# Patient Record
Sex: MALE | Race: WHITE | URBAN - METROPOLITAN AREA
[De-identification: names, ages, dates, MRNs, and addresses within clinical notes are randomized per-mention and may not be internally consistent; named-entity substitution may affect disease eponyms.]

---

## 2017-04-04 ENCOUNTER — OP HISTORICAL/CONVERTED ENCOUNTER (OUTPATIENT)
Dept: OTHER | Age: 57
End: 2017-04-04

## 2018-08-10 ENCOUNTER — OP HISTORICAL/CONVERTED ENCOUNTER (OUTPATIENT)
Dept: OTHER | Age: 58
End: 2018-08-10

## 2021-09-10 ENCOUNTER — HOSPITAL ENCOUNTER (EMERGENCY)
Age: 61
Discharge: HOME | End: 2021-09-10
Attending: FAMILY MEDICINE
Payer: MEDICARE

## 2021-09-10 VITALS
WEIGHT: 275 LBS | TEMPERATURE: 98.2 F | BODY MASS INDEX: 44.2 KG/M2 | DIASTOLIC BLOOD PRESSURE: 89 MMHG | OXYGEN SATURATION: 98 % | HEART RATE: 90 BPM | SYSTOLIC BLOOD PRESSURE: 142 MMHG | HEIGHT: 66 IN | RESPIRATION RATE: 16 BRPM

## 2021-09-10 DIAGNOSIS — M54.42 ACUTE BILATERAL LOW BACK PAIN WITH LEFT-SIDED SCIATICA: Primary | ICD-10-CM

## 2021-09-10 PROCEDURE — 99283 EMERGENCY DEPT VISIT LOW MDM: CPT

## 2021-09-10 PROCEDURE — 74011250636 HC RX REV CODE- 250/636: Performed by: FAMILY MEDICINE

## 2021-09-10 PROCEDURE — 74011636637 HC RX REV CODE- 636/637: Performed by: FAMILY MEDICINE

## 2021-09-10 PROCEDURE — A9270 NON-COVERED ITEM OR SERVICE: HCPCS | Performed by: FAMILY MEDICINE

## 2021-09-10 PROCEDURE — 96372 THER/PROPH/DIAG INJ SC/IM: CPT

## 2021-09-10 RX ORDER — ORPHENADRINE CITRATE 30 MG/ML
30 INJECTION INTRAMUSCULAR; INTRAVENOUS ONCE
Status: COMPLETED | OUTPATIENT
Start: 2021-09-10 | End: 2021-09-10

## 2021-09-10 RX ORDER — KETOROLAC TROMETHAMINE 30 MG/ML
30 INJECTION, SOLUTION INTRAMUSCULAR; INTRAVENOUS ONCE
Status: COMPLETED | OUTPATIENT
Start: 2021-09-10 | End: 2021-09-10

## 2021-09-10 RX ORDER — PREDNISONE 5 MG/1
5 TABLET ORAL DAILY
Qty: 5 TABLET | Refills: 0 | Status: SHIPPED | OUTPATIENT
Start: 2021-09-10 | End: 2021-09-15

## 2021-09-10 RX ORDER — NAPROXEN 500 MG/1
500 TABLET ORAL 2 TIMES DAILY WITH MEALS
Qty: 20 TABLET | Refills: 0 | Status: SHIPPED | OUTPATIENT
Start: 2021-09-10 | End: 2021-09-20

## 2021-09-10 RX ORDER — BACLOFEN 5 MG/1
5 TABLET ORAL
Qty: 15 TABLET | Refills: 0 | Status: SHIPPED | OUTPATIENT
Start: 2021-09-10 | End: 2021-09-15

## 2021-09-10 RX ORDER — LISINOPRIL 20 MG/1
20 TABLET ORAL DAILY
COMMUNITY

## 2021-09-10 RX ORDER — PREDNISONE 5 MG/1
5 TABLET ORAL ONCE
Status: COMPLETED | OUTPATIENT
Start: 2021-09-10 | End: 2021-09-10

## 2021-09-10 RX ADMIN — ORPHENADRINE CITRATE 30 MG: 60 INJECTION INTRAMUSCULAR; INTRAVENOUS at 19:40

## 2021-09-10 RX ADMIN — PREDNISONE 5 MG: 5 TABLET ORAL at 19:40

## 2021-09-10 RX ADMIN — KETOROLAC TROMETHAMINE 30 MG: 30 INJECTION, SOLUTION INTRAMUSCULAR; INTRAVENOUS at 19:41

## 2021-09-10 NOTE — ED PROVIDER NOTES
EMERGENCY DEPARTMENT HISTORY AND PHYSICAL EXAM      Date: 9/10/2021  Patient Name: Mohini Rico    History of Presenting Illness     Chief Complaint   Patient presents with    Back Pain       History Provided By: Patient    HPI: Mohini Rico, 64 y.o. male presents to the ED with cc of lower back pain and dysuria. On set 5 days ago. Constant achy pain in the lower B/L back radiating down the posterior left thigh. Walking or sitting on the left buttocks aggravate the pain. Applying weight more to the right thigh alleviates the pain. No OTC. No injuries or heavy lifting. Prior history of chronic back pain. Today symptoms is not different from prior back pain flareups. No flank pain, fever, body aches, abdominal pain, hematuria, dysuria, and all other symptoms are negative. There are no other complaints, changes, or physical findings at this time. PCP: None    No current facility-administered medications on file prior to encounter. Current Outpatient Medications on File Prior to Encounter   Medication Sig Dispense Refill    lisinopriL (PRINIVIL, ZESTRIL) 20 mg tablet Take 20 mg by mouth daily. Past History     Past Medical History:  Past Medical History:   Diagnosis Date    [facility] [address]     CAD (coronary artery disease)     Cirrhosis ([facility] [address]     Former consumption of alcohol     Former smoker     Hypertension     Leaky heart valve     Stroke ([facility] [address]        Past Surgical History:  No past surgical history on file. Family History:  No family history on file. Social History:  Social History     Tobacco Use    Smoking status: Former Smoker    Smokeless tobacco: Never Used   Substance Use Topics    Alcohol use: Not Currently    Drug use: Not on file       Allergies:   Allergies   Allergen Reactions    Amoxicillin Itching    Erythromycin Itching    Penicillins Itching         Review of Systems     Review of Systems   Constitutional: Negative for chills and fever.   HENT: Negative for congestion and sore throat. Eyes: Negative for photophobia and visual disturbance. Respiratory: Negative for cough and shortness of breath. Cardiovascular: Negative for chest pain and palpitations. Gastrointestinal: Negative for nausea and vomiting. Genitourinary: Negative for dysuria and flank pain. Musculoskeletal: Positive for back pain. Negative for arthralgias and myalgias. Skin: Negative for rash and wound. Allergic/Immunologic: Negative for environmental allergies and food allergies. Neurological: Negative for light-headedness and headaches. All other systems reviewed and are negative. Physical Exam     Physical Exam  Vitals and nursing note reviewed. Constitutional:       Appearance: Normal appearance. He is normal weight. HENT:      Head: Normocephalic and atraumatic. Eyes:      Extraocular Movements: Extraocular movements intact. Conjunctiva/sclera: Conjunctivae normal.   Cardiovascular:      Rate and Rhythm: Normal rate and regular rhythm. Pulses: Normal pulses. Heart sounds: Normal heart sounds. Pulmonary:      Effort: Pulmonary effort is normal.      Breath sounds: Normal breath sounds. Abdominal:      General: Abdomen is flat. Bowel sounds are normal. There is no distension. Palpations: Abdomen is soft. Tenderness: There is no abdominal tenderness. There is no right CVA tenderness, left CVA tenderness or guarding. Musculoskeletal:         General: No swelling. Cervical back: Normal.      Thoracic back: Normal.      Lumbar back: Tenderness present. No swelling, edema, signs of trauma or lacerations. Normal range of motion. Positive left straight leg raise test. Negative right straight leg raise test.        Back:    Skin:     General: Skin is warm. Capillary Refill: Capillary refill takes less than 2 seconds. Neurological:      General: No focal deficit present.       Mental Status: He is alert and oriented to person, place, and time. Psychiatric:         Mood and Affect: Mood normal.         Behavior: Behavior normal.         Thought Content: Thought content normal.         Judgment: Judgment normal.         Lab and Diagnostic Study Results     Labs -   No results found for this or any previous visit (from the past 12 hour(s)). Radiologic Studies -   @lastxrresult@  CT Results  (Last 48 hours)    None        CXR Results  (Last 48 hours)    None            Medical Decision Making   - I am the first provider for this patient. - I reviewed the vital signs, available nursing notes, past medical history, past surgical history, family history and social history. - Initial assessment performed. The patients presenting problems have been discussed, and they are in agreement with the care plan formulated and outlined with them. I have encouraged them to ask questions as they arise throughout their visit. Vital Signs-Reviewed the patient's vital signs. Patient Vitals for the past 12 hrs:   Temp Pulse Resp BP SpO2   09/10/21 1504 98.2 °F (36.8 °C) 90 16 (!) 142/89 98 %       Records Reviewed: Nursing Notes and Old Medical Records    The patient presents with back pain with a differential diagnosis of  kidney stone, lumbar strain and UTI. ED Course:          Provider Notes (Medical Decision Making):     MDM  Number of Diagnoses or Management Options  Risk of Complications, Morbidity, and/or Mortality  General comments: 7:20 PM  Patient is stable with no marked toxicity or distress. Treated with toradol and norflex. I reviewed all radiographic and laboratory results with the patient. All questions were answered and there are no apparent barriers to comprehension or communication. The patient verbalized agreement with the diagnosis, treatment plan, and understanding of the follow-up instructions. The patient is appropriate for discharge; leaves the Emergency Department walking with a stable gait.  Patient understands to return to the ED in 12-24 hours for any new or worsening symptoms or no  expected timely resolution of symptoms on mediations prescribed. Procedures   Medical Decision Makingedical Decision Making  Performed by: Anita Portillo DO  PROCEDURES:  Procedures       Disposition   Disposition: Condition stable  DC- Adult Discharges: All of the diagnostic tests were reviewed and questions answered. Diagnosis, care plan and treatment options were discussed. The patient understands the instructions and will follow up as directed. The patients results have been reviewed with them. They have been counseled regarding their diagnosis. The patient verbally convey understanding and agreement of the signs, symptoms, diagnosis, treatment and prognosis and additionally agrees to follow up as recommended with their PCP in 24 - 48 hours. They also agree with the care-plan and convey that all of their questions have been answered. I have also put together some discharge instructions for them that include: 1) educational information regarding their diagnosis, 2) how to care for their diagnosis at home, as well a 3) list of reasons why they would want to return to the ED prior to their follow-up appointment, should their condition change. DISCHARGE PLAN:  1. Current Discharge Medication List      CONTINUE these medications which have NOT CHANGED    Details   lisinopriL (PRINIVIL, ZESTRIL) 20 mg tablet Take 20 mg by mouth daily. 2.   Follow-up Information     Follow up With Specialties Details Why Shaq Pineda MD Family Medicine In 3 days ER follow-up [address] [facility] [address] [phone]          3. Return to ED if worse   4. Discharge Medication List as of 9/10/2021  7:46 PM      START taking these medications    Details   baclofen 5 mg tab Take 5 mg by mouth three (3) times daily as needed for Muscle Spasm(s) or Pain for up to 5 days. , Normal, Disp-15 Tablet, R-0      naproxen (Naprosyn) 500 mg tablet Take 1 Tablet by mouth two (2) times daily (with meals) for 10 days. , Normal, Disp-20 Tablet, R-0      predniSONE (DELTASONE) 5 mg tablet Take 1 Tablet by mouth daily for 5 days. With Breakfast, Normal, Disp-5 Tablet, R-0         CONTINUE these medications which have NOT CHANGED    Details   lisinopriL (PRINIVIL, ZESTRIL) 20 mg tablet Take 20 mg by mouth daily. , Historical Med               Diagnosis     Clinical Impression:   1. Acute bilateral low back pain with left-sided sciatica        Attestations:    Maricruz Henley, DO    Please note that this dictation was completed with Vouch, the computer voice recognition software. Quite often unanticipated grammatical, syntax, homophones, and other interpretive errors are inadvertently transcribed by the computer software. Please disregard these errors. Please excuse any errors that have escaped final proofreading. Thank you.

## 2022-04-21 ENCOUNTER — APPOINTMENT (OUTPATIENT)
Dept: NON INVASIVE DIAGNOSTICS | Age: 62
DRG: 271 | End: 2022-04-21
Attending: INTERNAL MEDICINE
Payer: MEDICARE

## 2022-04-21 ENCOUNTER — HOSPITAL ENCOUNTER (INPATIENT)
Age: 62
LOS: 1 days | Discharge: ANOTHER HOSPITAL | DRG: 271 | End: 2022-04-21
Attending: EMERGENCY MEDICINE | Admitting: INTERNAL MEDICINE
Payer: MEDICARE

## 2022-04-21 ENCOUNTER — APPOINTMENT (OUTPATIENT)
Dept: GENERAL RADIOLOGY | Age: 62
DRG: 271 | End: 2022-04-21
Attending: EMERGENCY MEDICINE
Payer: MEDICARE

## 2022-04-21 VITALS
HEIGHT: 66 IN | BODY MASS INDEX: 45 KG/M2 | HEART RATE: 94 BPM | OXYGEN SATURATION: 98 % | SYSTOLIC BLOOD PRESSURE: 165 MMHG | DIASTOLIC BLOOD PRESSURE: 84 MMHG | WEIGHT: 280 LBS | RESPIRATION RATE: 16 BRPM | TEMPERATURE: 98.2 F

## 2022-04-21 DIAGNOSIS — R07.9 CHEST PAIN, UNSPECIFIED TYPE: ICD-10-CM

## 2022-04-21 DIAGNOSIS — I21.4 NSTEMI (NON-ST ELEVATED MYOCARDIAL INFARCTION) (HCC): Primary | ICD-10-CM

## 2022-04-21 LAB
ACT BLD: 139 SEC (ref 74–125)
ACT BLD: 275 SEC (ref 74–125)
ACT BLD: 297 SEC (ref 74–125)
ALBUMIN SERPL-MCNC: 4 G/DL (ref 3.5–5)
ALBUMIN/GLOB SERPL: 1.2 {RATIO} (ref 1.1–2.2)
ALP SERPL-CCNC: 45 U/L (ref 45–117)
ALT SERPL-CCNC: 34 U/L (ref 12–78)
ANION GAP SERPL CALC-SCNC: 4 MMOL/L (ref 5–15)
AST SERPL W P-5'-P-CCNC: 51 U/L (ref 15–37)
ATRIAL RATE: 75 BPM
BASOPHILS # BLD: 0 K/UL (ref 0–0.1)
BASOPHILS NFR BLD: 1 % (ref 0–1)
BILIRUB SERPL-MCNC: 1.1 MG/DL (ref 0.2–1)
BUN SERPL-MCNC: 21 MG/DL (ref 6–20)
BUN/CREAT SERPL: 18 (ref 12–20)
CA-I BLD-MCNC: 8.6 MG/DL (ref 8.5–10.1)
CALCULATED P AXIS, ECG09: 46 DEGREES
CALCULATED R AXIS, ECG10: -38 DEGREES
CALCULATED T AXIS, ECG11: 33 DEGREES
CHLORIDE SERPL-SCNC: 105 MMOL/L (ref 97–108)
CO2 SERPL-SCNC: 30 MMOL/L (ref 21–32)
CREAT SERPL-MCNC: 1.14 MG/DL (ref 0.7–1.3)
DIAGNOSIS, 93000: NORMAL
DIFFERENTIAL METHOD BLD: NORMAL
ECHO EST RA PRESSURE: 8 MMHG
ECHO LA AREA 4C: 12.4 CM2
ECHO LA DIAMETER INDEX: 1.56 CM/M2
ECHO LA DIAMETER: 3.6 CM
ECHO LV EJECTION FRACTION BIPLANE: 68 % (ref 55–100)
ECHO LV FRACTIONAL SHORTENING: 38 % (ref 28–44)
ECHO LV INTERNAL DIMENSION DIASTOLE INDEX: 2.03 CM/M2
ECHO LV INTERNAL DIMENSION DIASTOLIC: 4.7 CM (ref 4.2–5.9)
ECHO LV INTERNAL DIMENSION SYSTOLIC INDEX: 1.26 CM/M2
ECHO LV INTERNAL DIMENSION SYSTOLIC: 2.9 CM
ECHO LV IVSD: 1.7 CM (ref 0.6–1)
ECHO LV MASS 2D: 324.4 G (ref 88–224)
ECHO LV MASS INDEX 2D: 140.4 G/M2 (ref 49–115)
ECHO LV POSTERIOR WALL DIASTOLIC: 1.5 CM (ref 0.6–1)
ECHO LV RELATIVE WALL THICKNESS RATIO: 0.64
ECHO MV E DECELERATION TIME (DT): 211 MS
ECHO RA AREA 4C: 12.5 CM2
ECHO RV INTERNAL DIMENSION: 2.7 CM
ECHO TV REGURGITANT PEAK GRADIENT: 21 MMHG
EOSINOPHIL # BLD: 0.3 K/UL (ref 0–0.4)
EOSINOPHIL NFR BLD: 6 % (ref 0–7)
ERYTHROCYTE [DISTWIDTH] IN BLOOD BY AUTOMATED COUNT: 13.1 % (ref 11.5–14.5)
GLOBULIN SER CALC-MCNC: 3.3 G/DL (ref 2–4)
GLUCOSE SERPL-MCNC: 127 MG/DL (ref 65–100)
HCT VFR BLD AUTO: 46.1 % (ref 36.6–50.3)
HGB BLD-MCNC: 15.8 G/DL (ref 12.1–17)
IMM GRANULOCYTES # BLD AUTO: 0 K/UL (ref 0–0.04)
IMM GRANULOCYTES NFR BLD AUTO: 0 % (ref 0–0.5)
LYMPHOCYTES # BLD: 0.9 K/UL (ref 0.8–3.5)
LYMPHOCYTES NFR BLD: 15 % (ref 12–49)
MCH RBC QN AUTO: 29.5 PG (ref 26–34)
MCHC RBC AUTO-ENTMCNC: 34.3 G/DL (ref 30–36.5)
MCV RBC AUTO: 86.2 FL (ref 80–99)
MONOCYTES # BLD: 0.5 K/UL (ref 0–1)
MONOCYTES NFR BLD: 8 % (ref 5–13)
NEUTS SEG # BLD: 4 K/UL (ref 1.8–8)
NEUTS SEG NFR BLD: 70 % (ref 32–75)
NRBC # BLD: 0 K/UL (ref 0–0.01)
NRBC BLD-RTO: 0 PER 100 WBC
P-R INTERVAL, ECG05: 182 MS
PERFORMED BY, TECHID: ABNORMAL
PLATELET # BLD AUTO: 181 K/UL (ref 150–400)
PMV BLD AUTO: 10.4 FL (ref 8.9–12.9)
POTASSIUM SERPL-SCNC: 4.1 MMOL/L (ref 3.5–5.1)
PROT SERPL-MCNC: 7.3 G/DL (ref 6.4–8.2)
Q-T INTERVAL, ECG07: 416 MS
QRS DURATION, ECG06: 88 MS
QTC CALCULATION (BEZET), ECG08: 464 MS
RBC # BLD AUTO: 5.35 M/UL (ref 4.1–5.7)
SODIUM SERPL-SCNC: 139 MMOL/L (ref 136–145)
TROPONIN-HIGH SENSITIVITY: ABNORMAL NG/L (ref 0–76)
TSH SERPL DL<=0.05 MIU/L-ACNC: 0.58 UIU/ML (ref 0.36–3.74)
VENTRICULAR RATE, ECG03: 75 BPM
WBC # BLD AUTO: 5.7 K/UL (ref 4.1–11.1)

## 2022-04-21 PROCEDURE — 4A023N7 MEASUREMENT OF CARDIAC SAMPLING AND PRESSURE, LEFT HEART, PERCUTANEOUS APPROACH: ICD-10-PCS | Performed by: INTERNAL MEDICINE

## 2022-04-21 PROCEDURE — 77030040934 HC CATH DIAG DXTERITY MEDT -A: Performed by: INTERNAL MEDICINE

## 2022-04-21 PROCEDURE — 77030002996 HC SUT SLK J&J -A: Performed by: INTERNAL MEDICINE

## 2022-04-21 PROCEDURE — C1725 CATH, TRANSLUMIN NON-LASER: HCPCS | Performed by: INTERNAL MEDICINE

## 2022-04-21 PROCEDURE — 93571 IV DOP VEL&/PRESS C FLO 1ST: CPT | Performed by: INTERNAL MEDICINE

## 2022-04-21 PROCEDURE — 74011250636 HC RX REV CODE- 250/636: Performed by: HOSPITALIST

## 2022-04-21 PROCEDURE — C1769 GUIDE WIRE: HCPCS | Performed by: INTERNAL MEDICINE

## 2022-04-21 PROCEDURE — 77030019698 HC SYR ANGI MDLON MRTM -A: Performed by: INTERNAL MEDICINE

## 2022-04-21 PROCEDURE — C1753 CATH, INTRAVAS ULTRASOUND: HCPCS | Performed by: INTERNAL MEDICINE

## 2022-04-21 PROCEDURE — 80053 COMPREHEN METABOLIC PANEL: CPT

## 2022-04-21 PROCEDURE — 77030013516 HC DEV INFL ANGI MRTM -B: Performed by: INTERNAL MEDICINE

## 2022-04-21 PROCEDURE — C1894 INTRO/SHEATH, NON-LASER: HCPCS | Performed by: INTERNAL MEDICINE

## 2022-04-21 PROCEDURE — 36415 COLL VENOUS BLD VENIPUNCTURE: CPT

## 2022-04-21 PROCEDURE — 99153 MOD SED SAME PHYS/QHP EA: CPT | Performed by: INTERNAL MEDICINE

## 2022-04-21 PROCEDURE — 77030042317 HC BND COMPR HEMSTAT -B: Performed by: INTERNAL MEDICINE

## 2022-04-21 PROCEDURE — 77030016754 HC CATH BLN INTAORT1 GTNG -G: Performed by: INTERNAL MEDICINE

## 2022-04-21 PROCEDURE — 84484 ASSAY OF TROPONIN QUANT: CPT

## 2022-04-21 PROCEDURE — 76937 US GUIDE VASCULAR ACCESS: CPT | Performed by: INTERNAL MEDICINE

## 2022-04-21 PROCEDURE — 74011000636 HC RX REV CODE- 636: Performed by: INTERNAL MEDICINE

## 2022-04-21 PROCEDURE — 83036 HEMOGLOBIN GLYCOSYLATED A1C: CPT

## 2022-04-21 PROCEDURE — 77030008542 HC TBNG MON PRSS EDWD -A: Performed by: INTERNAL MEDICINE

## 2022-04-21 PROCEDURE — 77030013797 HC KT TRNSDUC PRSSR EDWD -A: Performed by: INTERNAL MEDICINE

## 2022-04-21 PROCEDURE — 93005 ELECTROCARDIOGRAM TRACING: CPT

## 2022-04-21 PROCEDURE — C1874 STENT, COATED/COV W/DEL SYS: HCPCS | Performed by: INTERNAL MEDICINE

## 2022-04-21 PROCEDURE — B2111ZZ FLUOROSCOPY OF MULTIPLE CORONARY ARTERIES USING LOW OSMOLAR CONTRAST: ICD-10-PCS | Performed by: INTERNAL MEDICINE

## 2022-04-21 PROCEDURE — 93306 TTE W/DOPPLER COMPLETE: CPT

## 2022-04-21 PROCEDURE — 74011250636 HC RX REV CODE- 250/636: Performed by: INTERNAL MEDICINE

## 2022-04-21 PROCEDURE — 74011000250 HC RX REV CODE- 250: Performed by: INTERNAL MEDICINE

## 2022-04-21 PROCEDURE — 77030025703 HC SYR ANGI VACLOK MRTM -A: Performed by: INTERNAL MEDICINE

## 2022-04-21 PROCEDURE — 71045 X-RAY EXAM CHEST 1 VIEW: CPT

## 2022-04-21 PROCEDURE — 96374 THER/PROPH/DIAG INJ IV PUSH: CPT

## 2022-04-21 PROCEDURE — 92928 PRQ TCAT PLMT NTRAC ST 1 LES: CPT | Performed by: INTERNAL MEDICINE

## 2022-04-21 PROCEDURE — 65610000006 HC RM INTENSIVE CARE

## 2022-04-21 PROCEDURE — 77030016700 HC CATH ANGI DX INFN2 CARD -B: Performed by: INTERNAL MEDICINE

## 2022-04-21 PROCEDURE — 99152 MOD SED SAME PHYS/QHP 5/>YRS: CPT | Performed by: INTERNAL MEDICINE

## 2022-04-21 PROCEDURE — 85347 COAGULATION TIME ACTIVATED: CPT

## 2022-04-21 PROCEDURE — 85025 COMPLETE CBC W/AUTO DIFF WBC: CPT

## 2022-04-21 PROCEDURE — 99285 EMERGENCY DEPT VISIT HI MDM: CPT

## 2022-04-21 PROCEDURE — 5A02210 ASSISTANCE WITH CARDIAC OUTPUT USING BALLOON PUMP, CONTINUOUS: ICD-10-PCS | Performed by: INTERNAL MEDICINE

## 2022-04-21 PROCEDURE — 77030014363: Performed by: INTERNAL MEDICINE

## 2022-04-21 PROCEDURE — 2709999900 HC NON-CHARGEABLE SUPPLY: Performed by: INTERNAL MEDICINE

## 2022-04-21 PROCEDURE — C1887 CATHETER, GUIDING: HCPCS | Performed by: INTERNAL MEDICINE

## 2022-04-21 PROCEDURE — 74011250636 HC RX REV CODE- 250/636: Performed by: EMERGENCY MEDICINE

## 2022-04-21 PROCEDURE — 93458 L HRT ARTERY/VENTRICLE ANGIO: CPT | Performed by: INTERNAL MEDICINE

## 2022-04-21 PROCEDURE — 74011250637 HC RX REV CODE- 250/637: Performed by: INTERNAL MEDICINE

## 2022-04-21 PROCEDURE — 027034Z DILATION OF CORONARY ARTERY, ONE ARTERY WITH DRUG-ELUTING INTRALUMINAL DEVICE, PERCUTANEOUS APPROACH: ICD-10-PCS | Performed by: INTERNAL MEDICINE

## 2022-04-21 PROCEDURE — 33967 INSERT I-AORT PERCUT DEVICE: CPT | Performed by: INTERNAL MEDICINE

## 2022-04-21 PROCEDURE — 84443 ASSAY THYROID STIM HORMONE: CPT

## 2022-04-21 DEVICE — STENT COR DES 3.00X15MM -- DES RESOLUTE ONYX: Type: IMPLANTABLE DEVICE | Status: FUNCTIONAL

## 2022-04-21 RX ORDER — HEPARIN SODIUM 1000 [USP'U]/ML
2000 INJECTION, SOLUTION INTRAVENOUS; SUBCUTANEOUS AS NEEDED
Status: DISCONTINUED | OUTPATIENT
Start: 2022-04-21 | End: 2022-04-22 | Stop reason: HOSPADM

## 2022-04-21 RX ORDER — NITROGLYCERIN 5 MG/ML
INJECTION, SOLUTION INTRAVENOUS AS NEEDED
Status: DISCONTINUED | OUTPATIENT
Start: 2022-04-21 | End: 2022-04-21 | Stop reason: HOSPADM

## 2022-04-21 RX ORDER — HEPARIN SODIUM 1000 [USP'U]/ML
4000 INJECTION, SOLUTION INTRAVENOUS; SUBCUTANEOUS AS NEEDED
Status: DISCONTINUED | OUTPATIENT
Start: 2022-04-21 | End: 2022-04-22 | Stop reason: HOSPADM

## 2022-04-21 RX ORDER — HEPARIN SODIUM 10000 [USP'U]/100ML
12-25 INJECTION, SOLUTION INTRAVENOUS
Status: DISCONTINUED | OUTPATIENT
Start: 2022-04-21 | End: 2022-04-22 | Stop reason: HOSPADM

## 2022-04-21 RX ORDER — FENTANYL CITRATE 50 UG/ML
INJECTION, SOLUTION INTRAMUSCULAR; INTRAVENOUS AS NEEDED
Status: DISCONTINUED | OUTPATIENT
Start: 2022-04-21 | End: 2022-04-21 | Stop reason: HOSPADM

## 2022-04-21 RX ORDER — SODIUM CHLORIDE 0.9 % (FLUSH) 0.9 %
5-40 SYRINGE (ML) INJECTION AS NEEDED
Status: DISCONTINUED | OUTPATIENT
Start: 2022-04-21 | End: 2022-04-22 | Stop reason: HOSPADM

## 2022-04-21 RX ORDER — LIDOCAINE HYDROCHLORIDE 10 MG/ML
INJECTION INFILTRATION; PERINEURAL AS NEEDED
Status: DISCONTINUED | OUTPATIENT
Start: 2022-04-21 | End: 2022-04-21 | Stop reason: HOSPADM

## 2022-04-21 RX ORDER — ATORVASTATIN CALCIUM 40 MG/1
80 TABLET, FILM COATED ORAL
Status: DISCONTINUED | OUTPATIENT
Start: 2022-04-21 | End: 2022-04-22 | Stop reason: HOSPADM

## 2022-04-21 RX ORDER — ONDANSETRON 2 MG/ML
4 INJECTION INTRAMUSCULAR; INTRAVENOUS
Status: DISCONTINUED | OUTPATIENT
Start: 2022-04-21 | End: 2022-04-22 | Stop reason: HOSPADM

## 2022-04-21 RX ORDER — SODIUM CHLORIDE 0.9 % (FLUSH) 0.9 %
5-40 SYRINGE (ML) INJECTION AS NEEDED
Status: CANCELLED | OUTPATIENT
Start: 2022-04-21

## 2022-04-21 RX ORDER — ACETAMINOPHEN 325 MG/1
650 TABLET ORAL
Status: CANCELLED | OUTPATIENT
Start: 2022-04-21

## 2022-04-21 RX ORDER — HYDRALAZINE HYDROCHLORIDE 20 MG/ML
INJECTION INTRAMUSCULAR; INTRAVENOUS AS NEEDED
Status: DISCONTINUED | OUTPATIENT
Start: 2022-04-21 | End: 2022-04-21 | Stop reason: HOSPADM

## 2022-04-21 RX ORDER — HYDROCODONE BITARTRATE AND ACETAMINOPHEN 5; 325 MG/1; MG/1
1 TABLET ORAL
Status: CANCELLED | OUTPATIENT
Start: 2022-04-21

## 2022-04-21 RX ORDER — MIDAZOLAM HYDROCHLORIDE 1 MG/ML
INJECTION INTRAMUSCULAR; INTRAVENOUS AS NEEDED
Status: DISCONTINUED | OUTPATIENT
Start: 2022-04-21 | End: 2022-04-21 | Stop reason: HOSPADM

## 2022-04-21 RX ORDER — POLYETHYLENE GLYCOL 3350 17 G/17G
17 POWDER, FOR SOLUTION ORAL DAILY PRN
Status: DISCONTINUED | OUTPATIENT
Start: 2022-04-21 | End: 2022-04-22 | Stop reason: HOSPADM

## 2022-04-21 RX ORDER — SODIUM CHLORIDE 0.9 % (FLUSH) 0.9 %
5-40 SYRINGE (ML) INJECTION EVERY 8 HOURS
Status: CANCELLED | OUTPATIENT
Start: 2022-04-21

## 2022-04-21 RX ORDER — KETOROLAC TROMETHAMINE 30 MG/ML
15 INJECTION, SOLUTION INTRAMUSCULAR; INTRAVENOUS
Status: COMPLETED | OUTPATIENT
Start: 2022-04-21 | End: 2022-04-21

## 2022-04-21 RX ORDER — MORPHINE SULFATE 4 MG/ML
4 INJECTION INTRAVENOUS ONCE
Status: COMPLETED | OUTPATIENT
Start: 2022-04-21 | End: 2022-04-21

## 2022-04-21 RX ORDER — ACETAMINOPHEN 325 MG/1
650 TABLET ORAL
Status: DISCONTINUED | OUTPATIENT
Start: 2022-04-21 | End: 2022-04-22 | Stop reason: HOSPADM

## 2022-04-21 RX ORDER — MORPHINE SULFATE 4 MG/ML
1 INJECTION INTRAVENOUS
Status: CANCELLED | OUTPATIENT
Start: 2022-04-21

## 2022-04-21 RX ORDER — SODIUM CHLORIDE 0.9 % (FLUSH) 0.9 %
5-40 SYRINGE (ML) INJECTION EVERY 8 HOURS
Status: DISCONTINUED | OUTPATIENT
Start: 2022-04-21 | End: 2022-04-22 | Stop reason: HOSPADM

## 2022-04-21 RX ORDER — METOPROLOL TARTRATE 25 MG/1
25 TABLET, FILM COATED ORAL 2 TIMES DAILY
Status: DISCONTINUED | OUTPATIENT
Start: 2022-04-21 | End: 2022-04-22 | Stop reason: HOSPADM

## 2022-04-21 RX ORDER — ONDANSETRON 4 MG/1
4 TABLET, ORALLY DISINTEGRATING ORAL
Status: DISCONTINUED | OUTPATIENT
Start: 2022-04-21 | End: 2022-04-22 | Stop reason: HOSPADM

## 2022-04-21 RX ORDER — ACETAMINOPHEN 650 MG/1
650 SUPPOSITORY RECTAL
Status: DISCONTINUED | OUTPATIENT
Start: 2022-04-21 | End: 2022-04-22 | Stop reason: HOSPADM

## 2022-04-21 RX ORDER — HEPARIN SODIUM 1000 [USP'U]/ML
INJECTION, SOLUTION INTRAVENOUS; SUBCUTANEOUS AS NEEDED
Status: DISCONTINUED | OUTPATIENT
Start: 2022-04-21 | End: 2022-04-21 | Stop reason: HOSPADM

## 2022-04-21 RX ORDER — HYDRALAZINE HYDROCHLORIDE 20 MG/ML
10 INJECTION INTRAMUSCULAR; INTRAVENOUS ONCE
Status: COMPLETED | OUTPATIENT
Start: 2022-04-21 | End: 2022-04-21

## 2022-04-21 RX ORDER — NICARDIPINE HYDROCHLORIDE 2.5 MG/ML
INJECTION INTRAVENOUS AS NEEDED
Status: DISCONTINUED | OUTPATIENT
Start: 2022-04-21 | End: 2022-04-21 | Stop reason: HOSPADM

## 2022-04-21 RX ORDER — HEPARIN SODIUM 1000 [USP'U]/ML
4000 INJECTION, SOLUTION INTRAVENOUS; SUBCUTANEOUS ONCE
Status: COMPLETED | OUTPATIENT
Start: 2022-04-21 | End: 2022-04-21

## 2022-04-21 RX ORDER — HEPARIN SODIUM 200 [USP'U]/100ML
INJECTION, SOLUTION INTRAVENOUS
Status: COMPLETED | OUTPATIENT
Start: 2022-04-21 | End: 2022-04-21

## 2022-04-21 RX ADMIN — SODIUM CHLORIDE, PRESERVATIVE FREE 10 ML: 5 INJECTION INTRAVENOUS at 13:09

## 2022-04-21 RX ADMIN — KETOROLAC TROMETHAMINE 15 MG: 30 INJECTION, SOLUTION INTRAMUSCULAR; INTRAVENOUS at 11:13

## 2022-04-21 RX ADMIN — HEPARIN SODIUM 4000 UNITS: 1000 INJECTION, SOLUTION INTRAVENOUS; SUBCUTANEOUS at 12:58

## 2022-04-21 RX ADMIN — METOPROLOL TARTRATE 25 MG: 25 TABLET, FILM COATED ORAL at 20:26

## 2022-04-21 RX ADMIN — ONDANSETRON 4 MG: 2 INJECTION INTRAMUSCULAR; INTRAVENOUS at 20:41

## 2022-04-21 RX ADMIN — MORPHINE SULFATE 4 MG: 4 INJECTION INTRAVENOUS at 20:39

## 2022-04-21 RX ADMIN — HEPARIN SODIUM AND DEXTROSE 12 UNITS/KG/HR: 10000; 5 INJECTION INTRAVENOUS at 12:58

## 2022-04-21 RX ADMIN — HYDRALAZINE HYDROCHLORIDE 10 MG: 20 INJECTION, SOLUTION INTRAMUSCULAR; INTRAVENOUS at 13:47

## 2022-04-21 NOTE — Clinical Note
Multiple views of the left coronary artery obtained using hand injection.  Pre and post balloon placement

## 2022-04-21 NOTE — Clinical Note
Contrast Dose Calculator:   Patient's age: 58.   Patient's sex: Male. Patient weight (kg) = 127. Creatinine level (mg/dL) = 1.14. Creatinine clearance (mL/min): 120.69. Contrast concentration (mg/mL) = 370. MACD = 300 mL. Max Contrast dose per Creatinine Cl calculator = 271.55 mL.

## 2022-04-21 NOTE — Clinical Note
Single view of the aortic root obtained using power injection. Total volume = 30 mL. Rate = 15 mL/sec. Pressure = 500 PSI.

## 2022-04-21 NOTE — CONSULTS
CONSULTATION    REASON FOR CONSULT:  Chest pain     REQUESTING PROVIDER:  Dr. Deneen Henry:    Chief Complaint   Patient presents with    Chest Pain         HISTORY OF PRESENT ILLNESS:  Brandon Johnson is a 58y.o. year-old male with past medical history significant for coronary artery disease, hypertension, history of stroke, cirrhosis, cerebrovascular accident, and Alzheimer's dementia  who presented to ED for evaluation of chest pain. Patient states patient has had intermittent chest pain for the past 4 days. Patient received asa and nitroglycerin SL x 1 dose via EMS. Labs in the emergency department showed HS troponin 21897, EKG: normal sinus rhythm; heart rate 75, septal infarct. Discussed the risks and benefits of a cardiac catheterization and the patient has agreed to proceed with procedure. Written consent was obtained and he remains NPO. Records from hospital admission course thus far reviewed. Telemetry Review: EKG: NSR: heart rate 75. PAST MEDICAL HISTORY:    Past Medical History:   Diagnosis Date    [facility] ([facility] [address]     CAD (coronary artery disease)     Cirrhosis ([facility] [address]     Former consumption of alcohol     Former smoker     Hypertension     Leaky heart valve     Stroke ([facility] [address]        PAST SURGICAL HISTORY: No past surgical history on file. ALLERGIES:    Allergies   Allergen Reactions    Amoxicillin Itching    Erythromycin Itching    Penicillins Itching       FAMILY HISTORY:  No family history on file. SOCIAL HISTORY:    Tobacco: Former smoker  Drugs: Not documented. ETOH: History of EtoH abuse    HOME MEDICATIONS:    Prior to Admission Medications   Prescriptions Last Dose Informant Patient Reported? Taking?   lisinopriL (PRINIVIL, ZESTRIL) 20 mg tablet   Yes No   Sig: Take 20 mg by mouth daily.       Facility-Administered Medications: None       REVIEW OF SYSTEMS:  Complete review of systems performed, pertinents noted above, all other systems are negative. Patient Vitals for the past 24 hrs:   Temp Pulse Resp BP SpO2   04/21/22 1347  70  (!) 204/115    04/21/22 1026 97.9 °F (36.6 °C) 77 16 (!) 175/97 95 %       PHYSICAL EXAMINATION:    General: Well nourished well developed, NAD, A&O  HEENT: Normocephalic, PERRL, no drainage  Neck: Supple, Trachea midline, No JVD  RESP: CTA bilaterally. + Symmetrical chest movement. No SOB or distress. On RA  Cardiovascular: RRR no MRG  PVS: No rubor, cyanosis, no edema  ABD:  soft, NT, Normoactive BS  Derm: Warm/Dry/Intact with no lesions  Neuro: A&O PPTS, No focal deficits  PSYCH: No anxiety or agitation    Recent labs results and imaging reviewed. Recent Results (from the past 24 hour(s))   CBC WITH AUTOMATED DIFF    Collection Time: 04/21/22 10:15 AM   Result Value Ref Range    WBC 5.7 4.1 - 11.1 K/uL    RBC 5.35 4.10 - 5.70 M/uL    HGB 15.8 12.1 - 17.0 g/dL    HCT 46.1 36.6 - 50.3 %    MCV 86.2 80.0 - 99.0 FL    MCH 29.5 26.0 - 34.0 PG    MCHC 34.3 30.0 - 36.5 g/dL    RDW 13.1 11.5 - 14.5 %    PLATELET 709 517 - 582 K/uL    MPV 10.4 8.9 - 12.9 FL    NRBC 0.0 0.0  WBC    ABSOLUTE NRBC 0.00 0.00 - 0.01 K/uL    NEUTROPHILS 70 32 - 75 %    LYMPHOCYTES 15 12 - 49 %    MONOCYTES 8 5 - 13 %    EOSINOPHILS 6 0 - 7 %    BASOPHILS 1 0 - 1 %    IMMATURE GRANULOCYTES 0 0 - 0.5 %    ABS. NEUTROPHILS 4.0 1.8 - 8.0 K/UL    ABS. LYMPHOCYTES 0.9 0.8 - 3.5 K/UL    ABS. MONOCYTES 0.5 0.0 - 1.0 K/UL    ABS. EOSINOPHILS 0.3 0.0 - 0.4 K/UL    ABS. BASOPHILS 0.0 0.0 - 0.1 K/UL    ABS. IMM.  GRANS. 0.0 0.00 - 0.04 K/UL    DF AUTOMATED     METABOLIC PANEL, COMPREHENSIVE    Collection Time: 04/21/22 10:15 AM   Result Value Ref Range    Sodium 139 136 - 145 mmol/L    Potassium 4.1 3.5 - 5.1 mmol/L    Chloride 105 97 - 108 mmol/L    CO2 30 21 - 32 mmol/L    Anion gap 4 (L) 5 - 15 mmol/L    Glucose 127 (H) 65 - 100 mg/dL    BUN 21 (H) 6 - 20 mg/dL    Creatinine 1.14 0.70 - 1.30 mg/dL    BUN/Creatinine ratio 18 12 - 20 GFR est AA >60 >60 ml/min/1.73m2    GFR est non-AA >60 >60 ml/min/1.73m2    Calcium 8.6 8.5 - 10.1 mg/dL    Bilirubin, total 1.1 (H) 0.2 - 1.0 mg/dL    AST (SGOT) 51 (H) 15 - 37 U/L    ALT (SGPT) 34 12 - 78 U/L    Alk. phosphatase 45 45 - 117 U/L    Protein, total 7.3 6.4 - 8.2 g/dL    Albumin 4.0 3.5 - 5.0 g/dL    Globulin 3.3 2.0 - 4.0 g/dL    A-G Ratio 1.2 1.1 - 2.2     TROPONIN-HIGH SENSITIVITY    Collection Time: 04/21/22 10:15 AM   Result Value Ref Range    Troponin-High Sensitivity 13,448 (HH) 0 - 76 ng/L   EKG, 12 LEAD, INITIAL    Collection Time: 04/21/22 10:21 AM   Result Value Ref Range    Ventricular Rate 75 BPM    Atrial Rate 75 BPM    P-R Interval 182 ms    QRS Duration 88 ms    Q-T Interval 416 ms    QTC Calculation (Bezet) 464 ms    Calculated P Axis 46 degrees    Calculated R Axis -38 degrees    Calculated T Axis 33 degrees    Diagnosis       Normal sinus rhythm  Left axis deviation  Septal infarct (cited on or before 21-APR-2022)  Abnormal ECG  When compared with ECG of 21-APR-2022 10:20, (Unconfirmed)  No significant change was found  Confirmed by Artemio Serna MD, [facility] ([id number]) on 4/21/2022 12:00:04 PM     ECHO ADULT COMPLETE    Collection Time: 04/21/22  2:05 PM   Result Value Ref Range    EF BP 68 55 - 100 %    Fractional Shortening 2D 38 28 - 44 %    LVIDd 4.7 4.2 - 5.9 cm    LVIDd Index 2.03 cm/m2    LVIDs 2.9 cm    LVIDs Index 1.26 cm/m2    IVSd 1.7 (A) 0.6 - 1.0 cm    LVPWd 1.5 (A) 0.6 - 1.0 cm    LV RWT Ratio 0.64     LV Mass 2D 324.4 (A) 88 - 224 g    LV Mass 2D Index 140.4 (A) 49 - 115 g/m2    MV E Wave Deceleration Time 211.0 ms    LA Area 4C 12.4 cm2    LA Diameter 3.6 cm    LA Size Index 1.56 cm/m2    RVIDd 2.7 cm    RA Area 4C 12.5 cm2    Est. RA Pressure 8 mmHg    TR Peak Gradient 21 mmHg       XR Results (maximum last 3):   Results from Hospital Encounter encounter on 04/21/22    XR CHEST PORT    Impression  Mildly widened mediastinal contour, which can be secondary to  patient positioning. However, recommend CT chest with contrast for more  information. Current Facility-Administered Medications:     heparin 25,000 units in D5W 250 ml infusion, 12-25 Units/kg/hr (Adjusted), IntraVENous, TITRATE, Akil Vazquez MD, Last Rate: 10.7 mL/hr at 04/21/22 1258, 12 Units/kg/hr at 04/21/22 1258    heparin (porcine) 1,000 unit/mL injection 4,000 Units, 4,000 Units, IntraVENous, PRN **OR** heparin (porcine) 1,000 unit/mL injection 2,000 Units, 2,000 Units, IntraVENous, PRN, Akil Vazquez MD    sodium chloride (NS) flush 5-40 mL, 5-40 mL, IntraVENous, Q8H, Santa Leonardo MD, 10 mL at 04/21/22 1309    sodium chloride (NS) flush 5-40 mL, 5-40 mL, IntraVENous, PRN, Srini Valente MD    acetaminophen (TYLENOL) tablet 650 mg, 650 mg, Oral, Q6H PRN **OR** acetaminophen (TYLENOL) suppository 650 mg, 650 mg, Rectal, Q6H PRN, Srini Valente MD    polyethylene glycol (MIRALAX) packet 17 g, 17 g, Oral, DAILY PRN, Srini Valente MD    ondansetron (ZOFRAN ODT) tablet 4 mg, 4 mg, Oral, Q8H PRN **OR** ondansetron (ZOFRAN) injection 4 mg, 4 mg, IntraVENous, Q6H PRN, Srini Valente MD    [Held by provider] atorvastatin (LIPITOR) tablet 80 mg, 80 mg, Oral, QHS, Hoa Branch MD    metoprolol tartrate (LOPRESSOR) tablet 25 mg, 25 mg, Oral, BID, Hoa Branch MD    Current Outpatient Medications:     lisinopriL (PRINIVIL, ZESTRIL) 20 mg tablet, Take 20 mg by mouth daily. , Disp: , Rfl:       Case discussed with collaborating physician Dr. Teresa Duarte and our impression and recommendations are as follows:     1. NSTEMI:   - patient with intermittent chest pain for the past 4 days, associated with SOB. Given asa and nitro x 1 in route. Continue heparin gtt. - HS troponin 92125, EKG no ischemic changes.    - Echo: EF 65-70% normal wall motion, abnormal diastolic.   - Discussed the risks and benefits of a cardiac catheterization with the patient and he has agreed to the procedure. Written consent was obtained. Patient is NPO.     2. Hypertension:   - blood pressure is elevated  - will give a one time dose of IV hydralazine 10 mg, recheck BP in 30 minutes. 3. History of liver disease:   - patient has a history of LUQUE and  Chronic hepatitis C, genotype ( followed at [facility])  - will check LFTs and hold atorvastatin at this time. 4. Diabetes Mellitus:   - managed per primary team    Thank you for involving us in the care of this patient. Please do not hesitate to call if additional questions arise. If after hours please call [phone].

## 2022-04-21 NOTE — H&P
Hospitalist Admission Note    NAME: Sancho Manriquez   :  1960   MRN:  [id number]     Date/Time:  2022 1:21 PM    Patient PCP: Manju Montero MD  ______________________________________________________________________  Given the patient's current clinical presentation, I have a high level of concern for decompensation if discharged from the emergency department. Complex decision making was performed, which includes reviewing the patient's available past medical records, laboratory results, and x-ray films. Subjective:   CHIEF COMPLAINT: chest pain and shortness of breath on exertion. HISTORY OF PRESENT ILLNESS:     Bryan Ortega is a 58 y.o. Male with hx of cirrhosis, CAD, hypertension, remote hx of alcohol use and ex smoker presented to ED with chest pain. Patient reports exertional chest pain for last 3 days. Patient reports he has been having chest pain with minimal exertion which is getting worse and now he has chest pain even at rest. Patient also reports shortness of breath on exertion for same time duration. Denies palpitations, nausea, vomiting, diaphoresis. Patient reports he follows Dr. Dustin Gray and had stress test done couple of years back. In ED patient was found to be hypertensives and troponin elevated to 13,448. We were asked to admit for work up and evaluation of the above problems. Past Medical History:   Diagnosis Date    [facility]'s dementia ([facility] [address]     CAD (coronary artery disease)     Cirrhosis ([facility] [address]     Former consumption of alcohol     Former smoker     Hypertension     Leaky heart valve     Stroke ([facility] [address]         No past surgical history on file. Social History     Tobacco Use    Smoking status: Former Smoker    Smokeless tobacco: Never Used   Substance Use Topics    Alcohol use: Not Currently      Family history: family history reviewed and not pertinent to patient's presentation.   Allergies   Allergen Reactions    Amoxicillin Itching    Erythromycin Itching    Penicillins Itching        Prior to Admission medications    Medication Sig Start Date End Date Taking? Authorizing Provider   lisinopriL (PRINIVIL, ZESTRIL) 20 mg tablet Take 20 mg by mouth daily. Other, MD Mishel       REVIEW OF SYSTEMS:     I am not able to complete the review of systems because:    The patient is intubated and sedated    The patient has altered mental status due to his acute medical problems    The patient has baseline aphasia from prior stroke(s)    The patient has baseline dementia and is not reliable historian    The patient is in acute medical distress and unable to provide information           Total of 12 systems reviewed as follows:       POSITIVE= underlined text  Negative = text not underlined  General:  fever, chills, sweats, generalized weakness, weight loss/gain,      loss of appetite   Eyes:    blurred vision, eye pain, loss of vision, double vision  ENT:    rhinorrhea, pharyngitis   Respiratory:   cough, sputum production, SOB, MARTINEZ, wheezing, pleuritic pain   Cardiology:   chest pain, palpitations, orthopnea, PND, edema, syncope   Gastrointestinal:  abdominal pain , N/V, diarrhea, dysphagia, constipation, bleeding   Genitourinary:  frequency, urgency, dysuria, hematuria, incontinence   Muskuloskeletal :  arthralgia, myalgia, back pain  Hematology:  easy bruising, nose or gum bleeding, lymphadenopathy   Dermatological: rash, ulceration, pruritis, color change / jaundice  Endocrine:   hot flashes or polydipsia   Neurological:  headache, dizziness, confusion, focal weakness, paresthesia,     Speech difficulties, memory loss, gait difficulty  Psychological: Feelings of anxiety, depression, agitation    Objective:   VITALS:    Visit Vitals  BP (!) 175/97 (BP 1 Location: Left upper arm, BP Patient Position: At rest)   Pulse 77   Temp 97.9 °F (36.6 °C)   Resp 16   Ht 5' 6\" (1.676 m)   Wt 127 kg (280 lb)   SpO2 95%   BMI 45.19 kg/m²       PHYSICAL EXAM:    General:    Alert, cooperative, no distress, appears stated age. Obese  HEENT: Atraumatic, anicteric sclerae, pink conjunctivae     No oral ulcers, mucosa moist, throat clear, dentition fair  Neck:  Supple, symmetrical,  thyroid: non tender  Lungs:   Clear to auscultation bilaterally. No Wheezing or Rhonchi. No rales. Chest wall:  No tenderness  No Accessory muscle use. Heart:   Regular  rhythm,  No  murmur   No edema  Abdomen:   Soft, non-tender. Not distended. Bowel sounds normal  Extremities: No cyanosis. No clubbing,      Skin turgor normal, Capillary refill normal, Radial dial pulse 2+  Skin:     Not pale. Not Jaundiced  No rashes   Psych:  Good insight. Not depressed. Not anxious or agitated. Neurologic: EOMs intact. No facial asymmetry. No aphasia or slurred speech. Symmetrical strength, Sensation grossly intact. Alert and oriented X 4.     _______________________________________________________________________      My assessment of this patient's clinical condition and my plan of care is as follows. Assessment / Plan:  NSTEMI:  S/p aspirin and nitro by EMS  Started on heparin drip  Continue heparin drip  Start aspirin, statin  Will consult cardiology   ECHO  Will send lipid panel, HbA1C and TSH    Hypertension:  Continue anti hypertensives    Obesity: diet and exercise counselling. Code Status: full  DVT Prophylaxis: on heparin drip  GI Prophylaxis: not indicated        Care Plan discussed with:    Comments   Patient x    Family      RN     Care Manager                    Consultant:      _______________________________________________________________________  Expected  Disposition:   Home with Family x   HH/PT/OT/RN    SNF/LTC    [place]    ________________________________________________________________________    I personally spent   35  minutes in patient's care.   This is time spent at  patient's bedside actively involved in patient care as well as the coordination of care and discussions with the patient's family. This does not include any procedural time which has been billed separately. ________________________________________________________________________  Signed: Lisa Ortiz MD    Procedures: see electronic medical records for all procedures/Xrays and details which were not copied into this note but were reviewed prior to creation of Plan. LAB DATA REVIEWED:    Recent Results (from the past 24 hour(s))   CBC WITH AUTOMATED DIFF    Collection Time: 04/21/22 10:15 AM   Result Value Ref Range    WBC 5.7 4.1 - 11.1 K/uL    RBC 5.35 4.10 - 5.70 M/uL    HGB 15.8 12.1 - 17.0 g/dL    HCT 46.1 36.6 - 50.3 %    MCV 86.2 80.0 - 99.0 FL    MCH 29.5 26.0 - 34.0 PG    MCHC 34.3 30.0 - 36.5 g/dL    RDW 13.1 11.5 - 14.5 %    PLATELET 736 861 - 688 K/uL    MPV 10.4 8.9 - 12.9 FL    NRBC 0.0 0.0  WBC    ABSOLUTE NRBC 0.00 0.00 - 0.01 K/uL    NEUTROPHILS 70 32 - 75 %    LYMPHOCYTES 15 12 - 49 %    MONOCYTES 8 5 - 13 %    EOSINOPHILS 6 0 - 7 %    BASOPHILS 1 0 - 1 %    IMMATURE GRANULOCYTES 0 0 - 0.5 %    ABS. NEUTROPHILS 4.0 1.8 - 8.0 K/UL    ABS. LYMPHOCYTES 0.9 0.8 - 3.5 K/UL    ABS. MONOCYTES 0.5 0.0 - 1.0 K/UL    ABS. EOSINOPHILS 0.3 0.0 - 0.4 K/UL    ABS. BASOPHILS 0.0 0.0 - 0.1 K/UL    ABS. IMM. GRANS. 0.0 0.00 - 0.04 K/UL    DF AUTOMATED     METABOLIC PANEL, COMPREHENSIVE    Collection Time: 04/21/22 10:15 AM   Result Value Ref Range    Sodium 139 136 - 145 mmol/L    Potassium 4.1 3.5 - 5.1 mmol/L    Chloride 105 97 - 108 mmol/L    CO2 30 21 - 32 mmol/L    Anion gap 4 (L) 5 - 15 mmol/L    Glucose 127 (H) 65 - 100 mg/dL    BUN 21 (H) 6 - 20 mg/dL    Creatinine 1.14 0.70 - 1.30 mg/dL    BUN/Creatinine ratio 18 12 - 20      GFR est AA >60 >60 ml/min/1.73m2    GFR est non-AA >60 >60 ml/min/1.73m2    Calcium 8.6 8.5 - 10.1 mg/dL    Bilirubin, total 1.1 (H) 0.2 - 1.0 mg/dL    AST (SGOT) 51 (H) 15 - 37 U/L    ALT (SGPT) 34 12 - 78 U/L    Alk.  phosphatase 45 45 - 117 U/L    Protein, total 7.3 6.4 - 8.2 g/dL    Albumin 4.0 3.5 - 5.0 g/dL    Globulin 3.3 2.0 - 4.0 g/dL    A-G Ratio 1.2 1.1 - 2.2     TROPONIN-HIGH SENSITIVITY    Collection Time: 04/21/22 10:15 AM   Result Value Ref Range    Troponin-High Sensitivity 13,448 (HH) 0 - 76 ng/L   EKG, 12 LEAD, INITIAL    Collection Time: 04/21/22 10:21 AM   Result Value Ref Range    Ventricular Rate 75 BPM    Atrial Rate 75 BPM    P-R Interval 182 ms    QRS Duration 88 ms    Q-T Interval 416 ms    QTC Calculation (Bezet) 464 ms    Calculated P Axis 46 degrees    Calculated R Axis -38 degrees    Calculated T Axis 33 degrees    Diagnosis       Normal sinus rhythm  Left axis deviation  Septal infarct (cited on or before 21-APR-2022)  Abnormal ECG  When compared with ECG of 21-APR-2022 10:20, (Unconfirmed)  No significant change was found  Confirmed by Rojas Hill MD, [facility] ([id number]) on 4/21/2022 12:00:04 PM

## 2022-04-21 NOTE — PROGRESS NOTES
TRANSFER - OUT REPORT:    Verbal report given to [address]  being transferred to (unit) for routine progression of care       Report consisted of patients Situation, Background, Assessment and   Recommendations(SBAR). Information from the following report(s) SBAR, Kardex, ED Summary, MAR and Med Rec Status was reviewed with the receiving nurse. Lines:    Peripheral IV 04/21/22 Right Antecubital (Active)        Opportunity for questions and clarification was provided.       Patient transported with:   [employer]

## 2022-04-21 NOTE — Clinical Note
Multiple views of the left coronary artery obtained using hand injection.  Pre/post NC balloon inflation

## 2022-04-21 NOTE — PROGRESS NOTES
Reason for Admission:  NSTEMI                     RUR Score:N/A                     Plan for utilizing home health:   None @ this time. Uses cane/walker/w/c if needed. PCP: First and Last name:  Mora Alonzo MD     Name of Practice:    Are you a current patient: Yes/No: Yes   Approximate date of last visit: Seen 2/3 years ago. Can you participate in a virtual visit with your PCP: Yes/Call                    Current Advanced Directive/Advance Care Plan: Full Code      Healthcare Decision Maker:              Primary Decision Maker: Corine Hoffman - Spouse - [phone]                  Transition of Care Plan:    D/C Plan is home with wife & she will transport home/cab ( depending on d/c time). RXs to be called into [email or web address] [address], in [place] upon discharge.

## 2022-04-21 NOTE — DISCHARGE SUMMARY
Hospitalist Discharge Summary     Patient ID:  Antoinette Quiñones  [id number]  89 y.o.  1960 4/21/2022    PCP on record: Alyce Mercedes MD    Admit date: 4/21/2022  Discharge date and time: 4/21/2022    DISCHARGE DIAGNOSIS:    STEMI  Hypertension    CONSULTATIONS:  IP CONSULT TO CARDIOLOGY    Excerpted HPI from H&P of Sourav Stevenson MD:  Mario Ibarra is a 58 y.o. Male with hx of cirrhosis, CAD, hypertension, remote hx of alcohol use and ex smoker presented to ED with chest pain. Patient reports exertional chest pain for last 3 days. Patient reports he has been having chest pain with minimal exertion which is getting worse and now he has chest pain even at rest. Patient also reports shortness of breath on exertion for same time duration. Denies palpitations, nausea, vomiting, diaphoresis. ______________________________________________________________________  DISCHARGE SUMMARY/HOSPITAL COURSE:  for full details see H&P, daily progress notes, labs, consult notes. Cardiac cath was done. S/p PCI mid LAD. Ostial left main 90%. S/p IABP for left main disease and residual anterior ST elevation. Patient is being transferred to [facility] for urgent CABG due to left main disease. Accepting physician: Dr. Juarez [facility]          _______________________________________________________________________  Patient seen and examined by me on discharge day. Pertinent Findings:  Gen:    Not in distress  Chest: Clear lungs  CVS:   Regular rhythm. No edema  Abd:  Soft, not distended, not tender  Neuro:  Alert,oriented  _______________________________________________________________________  DISCHARGE MEDICATIONS:   Current Discharge Medication List            Patient Follow Up Instructions:      Follow-up Information     Follow up With Specialties Details Why Contact Info    Alyce Mercedes MD Internal Medicine   [address] [place], [place] [id number] [phone] ________________________________________________________________    Risk of deterioration: high    Condition at Discharge:  Stable  __________________________________________________________________    Disposition  Higher level of care to [facility]    ____________________________________________________________________    Code Status: Full code  ___________________________________________________________________      Total time in minutes spent coordinating this discharge (includes going over instructions, follow-up, prescriptions, and preparing report for sign off to her PCP) :  35 minutes    Signed:  Jessica Young MD

## 2022-04-21 NOTE — Clinical Note
Pt AK was 2293. Pt given radiation exposure pamphlet and educated concerning over exposure signs and symptoms.

## 2022-04-21 NOTE — PROGRESS NOTES
1830: Patient arrival to CVICU 282. Bedside report with SBAR completed with Neo Atkinson. Patient Alert and oriented x4 and able to follow commands. Complaints 4/10 chest pain. Dr Gr Risk to bedside and aware. VSS at this time. Balloon pump set to 1:1.     1845: Bedside shift report given to Haydee Dyson. Bedside SBAR completed with Oncoming RN.

## 2022-04-21 NOTE — Clinical Note
TRANSFER - OUT REPORT:     Verbal report given to: Claudean Loach, (at bedside). Report consisted of patient's Situation, Background, Assessment and   Recommendations(SBAR). Opportunity for questions and clarification was provided. Patient transported with a Registered Nurse. Patient transported to: [address] [place] [address], [facility].

## 2022-04-21 NOTE — ACP (ADVANCE CARE PLANNING)
Advance Care Planning   Healthcare Decision Maker:       Primary Decision Maker: Jasmin Contreras - [facility] - [phone]

## 2022-04-21 NOTE — ED PROVIDER NOTES
EMERGENCY DEPARTMENT HISTORY AND PHYSICAL EXAM      Date: 4/21/2022  Patient Name: Lluvia Shahid    History of Presenting Illness     Chief Complaint   Patient presents with    Chest Pain       History Provided By: Patient    HPI: Lluvia Shahid, 58 y.o. male with a past medical history significant hypertension and Coronary artery disease presents to the ED with cc of chest pain. Patient states the pain is intermittent and substernal, worse with movement patient denies shortness of breath, chills. Patient states the pain is without radiation. Patient was given nitroglycerin x1 as well as aspirin in route per EMS. Patient reports pain has improved somewhat since then. There are no other complaints, changes, or physical findings at this time. PCP: Gutierrez Henry MD    No current facility-administered medications on file prior to encounter. Current Outpatient Medications on File Prior to Encounter   Medication Sig Dispense Refill    lisinopriL (PRINIVIL, ZESTRIL) 20 mg tablet Take 20 mg by mouth daily. Past History     Past Medical History:  Past Medical History:   Diagnosis Date    Alzheimer's dementia ([facility] [address]     CAD (coronary artery disease)     Cirrhosis ([facility] [address]     Former consumption of alcohol     Former smoker     Hypertension     Leaky heart valve     Stroke ([facility] [address]        Past Surgical History:  No past surgical history on file. Family History:  No family history on file. Social History:  Social History     Tobacco Use    Smoking status: Former Smoker    Smokeless tobacco: Never Used   Substance Use Topics    Alcohol use: Not Currently    Drug use: Not on file       Allergies: Allergies   Allergen Reactions    Amoxicillin Itching    Erythromycin Itching    Penicillins Itching         Review of Systems   Review of Systems   Constitutional: Negative for chills and fever. HENT: Negative for sinus pressure and sinus pain.     Eyes: Negative for photophobia and redness. Respiratory: Negative for shortness of breath and wheezing. Cardiovascular: Positive for chest pain and negative for palpitations. Gastrointestinal: Negative for abdominal pain and nausea. Genitourinary: Negative for flank pain and hematuria. Musculoskeletal: Negative for arthralgias and gait problem. Skin: Negative for color change and pallor. Neurological: Negative for dizziness and weakness. Review of Systems    Physical Exam   Physical Exam  Constitutional:       General: No acute distress. Appearance: Normal appearance. Not toxic-appearing. HENT:      Head: Normocephalic and atraumatic. Nose: Nose normal.      Mouth/Throat:      Mouth: Mucous membranes are moist.   Eyes:      Extraocular Movements: Extraocular movements intact. Pupils: Pupils are equal, round, and reactive to light. Cardiovascular:      Rate and Rhythm: Normal rate. Pulses: Normal pulses. Pulmonary:      Effort: Pulmonary effort is normal.      Breath sounds: No stridor, clear to auscultation bilaterally  Abdominal:      General: Abdomen is flat. There is no distension. Musculoskeletal:         General: Normal range of motion. Cervical back: Normal range of motion and neck supple. Skin:     General: Skin is warm and dry. Capillary Refill: Capillary refill takes less than 2 seconds. Neurological:      General: No focal deficit present. Mental Status: Alert and oriented to person, place, and time.    Psychiatric:         Mood and Affect: Mood normal.         Behavior: Behavior normal.       Physical Exam    Lab and Diagnostic Study Results     Labs -     Recent Results (from the past 12 hour(s))   CBC WITH AUTOMATED DIFF    Collection Time: 04/21/22 10:15 AM   Result Value Ref Range    WBC 5.7 4.1 - 11.1 K/uL    RBC 5.35 4.10 - 5.70 M/uL    HGB 15.8 12.1 - 17.0 g/dL    HCT 46.1 36.6 - 50.3 %    MCV 86.2 80.0 - 99.0 FL    MCH 29.5 26.0 - 34.0 PG    MCHC 34.3 30.0 - 36.5 g/dL    RDW 13.1 11.5 - 14.5 %    PLATELET 111 164 - 495 K/uL    MPV 10.4 8.9 - 12.9 FL    NRBC 0.0 0.0  WBC    ABSOLUTE NRBC 0.00 0.00 - 0.01 K/uL    NEUTROPHILS 70 32 - 75 %    LYMPHOCYTES 15 12 - 49 %    MONOCYTES 8 5 - 13 %    EOSINOPHILS 6 0 - 7 %    BASOPHILS 1 0 - 1 %    IMMATURE GRANULOCYTES 0 0 - 0.5 %    ABS. NEUTROPHILS 4.0 1.8 - 8.0 K/UL    ABS. LYMPHOCYTES 0.9 0.8 - 3.5 K/UL    ABS. MONOCYTES 0.5 0.0 - 1.0 K/UL    ABS. EOSINOPHILS 0.3 0.0 - 0.4 K/UL    ABS. BASOPHILS 0.0 0.0 - 0.1 K/UL    ABS. IMM. GRANS. 0.0 0.00 - 0.04 K/UL    DF AUTOMATED     METABOLIC PANEL, COMPREHENSIVE    Collection Time: 04/21/22 10:15 AM   Result Value Ref Range    Sodium 139 136 - 145 mmol/L    Potassium 4.1 3.5 - 5.1 mmol/L    Chloride 105 97 - 108 mmol/L    CO2 30 21 - 32 mmol/L    Anion gap 4 (L) 5 - 15 mmol/L    Glucose 127 (H) 65 - 100 mg/dL    BUN 21 (H) 6 - 20 mg/dL    Creatinine 1.14 0.70 - 1.30 mg/dL    BUN/Creatinine ratio 18 12 - 20      GFR est AA >60 >60 ml/min/1.73m2    GFR est non-AA >60 >60 ml/min/1.73m2    Calcium 8.6 8.5 - 10.1 mg/dL    Bilirubin, total 1.1 (H) 0.2 - 1.0 mg/dL    AST (SGOT) 51 (H) 15 - 37 U/L    ALT (SGPT) 34 12 - 78 U/L    Alk.  phosphatase 45 45 - 117 U/L    Protein, total 7.3 6.4 - 8.2 g/dL    Albumin 4.0 3.5 - 5.0 g/dL    Globulin 3.3 2.0 - 4.0 g/dL    A-G Ratio 1.2 1.1 - 2.2     TROPONIN-HIGH SENSITIVITY    Collection Time: 04/21/22 10:15 AM   Result Value Ref Range    Troponin-High Sensitivity 13,448 (HH) 0 - 76 ng/L   EKG, 12 LEAD, INITIAL    Collection Time: 04/21/22 10:21 AM   Result Value Ref Range    Ventricular Rate 75 BPM    Atrial Rate 75 BPM    P-R Interval 182 ms    QRS Duration 88 ms    Q-T Interval 416 ms    QTC Calculation (Bezet) 464 ms    Calculated P Axis 46 degrees    Calculated R Axis -38 degrees    Calculated T Axis 33 degrees    Diagnosis       Normal sinus rhythm  Left axis deviation  Septal infarct (cited on or before 21-APR-2022)  Abnormal ECG  When compared with ECG of 21-APR-2022 10:20, (Unconfirmed)  No significant change was found  Confirmed by BORIS Rosado MD ([id number]) on 4/21/2022 12:00:04 PM         Radiologic Studies -   @lastxrresult@  CT Results  (Last 48 hours)    None        CXR Results  (Last 48 hours)               04/21/22 1041  XR CHEST PORT Final result    Impression:  Mildly widened mediastinal contour, which can be secondary to   patient positioning. However, recommend CT chest with contrast for more   information. Narrative:  EXAM: XR CHEST PORT       INDICATION: chest pain       COMPARISON: September 14, 2015       FINDINGS: Suspected basilar opacity, atelectasis and/or airspace disease. Mildly   widened mediastinal contour. No pneumothorax or sizable pleural effusion. No   acute fracture or dislocation. Medical Decision Making   - I am the first provider for this patient. - I reviewed the vital signs, available nursing notes, past medical history, past surgical history, family history and social history. - Initial assessment performed. The patients presenting problems have been discussed, and they are in agreement with the care plan formulated and outlined with them. I have encouraged them to ask questions as they arise throughout their visit. Vital Signs-Reviewed the patient's vital signs. Patient Vitals for the past 12 hrs:   Temp Pulse Resp BP SpO2   04/21/22 1026 97.9 °F (36.6 °C) 77 16 (!) 175/97 95 %         Disposition   Disposition: Admitted to Floor Medical Floor the case was discussed with the admitting physician     Admitted    Diagnosis     Clinical Impression:   1. NSTEMI (non-ST elevated myocardial infarction) ([facility] [address]    2. Chest pain, unspecified type        Attestations:    Danielle Molina MD    Please note that this dictation was completed with [employer], the [employer] voice recognition software.   Quite often unanticipated grammatical, syntax, homophones, and other interpretive errors are inadvertently transcribed by the computer software. Please disregard these errors. Please excuse any errors that have escaped final proofreading. Thank you.

## 2022-04-22 LAB
EST. AVERAGE GLUCOSE BLD GHB EST-MCNC: 123 MG/DL
HBA1C MFR BLD: 5.9 % (ref 4–5.6)

## 2022-04-22 NOTE — PROGRESS NOTES
1845 = received patient from the outgoing shift nurse. VS and assessment done. Still with continuous chest pain. Will try to call the cardiologist  1945 = report given to the accepting nurse at [facility]  [facility]  2000 = delay with the airlift. Have to change aircraft per transport center. ETA another 30 minutes  2015 = patient complaining of midsternal chest pain 7/10. Dr. Queta Abreu paged for order. With order noted. Called pharmacy to verify order at once  2030 = right radial band discontinued. Pressure dressing applied. No bleeding nor hematoma noted. 2055 = [facility] at the bedside. For transport to [facility]  2130 = transported to the [facility] with the undersigned RN  2200 = called wife to update about the transfer.

## 2022-04-25 NOTE — PROGRESS NOTES
Phase 2 outpatient cardiac rehab referral is not appropriate for this patient at this time due to physical and/or mental limitations, diagnosis and/or treatment plans.  Patient's information forwarded to [facility] for follow up

## 2022-10-28 NOTE — Clinical Note
8fr right groin sheath sutured in place. Tegaderm dressing applied. Site is clean, dry and intact. Updated Dr. Tanner Grove on elevated Aptt new orders to draw Aptt now and call with results.

## 2024-01-12 ENCOUNTER — HOSPITAL ENCOUNTER (OUTPATIENT)
Facility: HOSPITAL | Age: 64
End: 2024-01-12
Payer: MEDICARE

## 2024-01-12 VITALS
HEART RATE: 65 BPM | BODY MASS INDEX: 44.67 KG/M2 | TEMPERATURE: 97.6 F | DIASTOLIC BLOOD PRESSURE: 76 MMHG | HEIGHT: 67 IN | RESPIRATION RATE: 18 BRPM | WEIGHT: 284.6 LBS | SYSTOLIC BLOOD PRESSURE: 143 MMHG | OXYGEN SATURATION: 96 %

## 2024-01-12 LAB
ALBUMIN SERPL-MCNC: 3.6 G/DL (ref 3.5–5)
ALBUMIN/GLOB SERPL: 1 (ref 1.1–2.2)
ALP SERPL-CCNC: 57 U/L (ref 45–117)
ALT SERPL-CCNC: 25 U/L (ref 12–78)
ANION GAP SERPL CALC-SCNC: 4 MMOL/L (ref 5–15)
APTT PPP: 24.1 SEC (ref 21.2–34.1)
AST SERPL W P-5'-P-CCNC: 14 U/L (ref 15–37)
BASOPHILS # BLD: 0.1 K/UL (ref 0–0.1)
BASOPHILS NFR BLD: 1 % (ref 0–1)
BILIRUB SERPL-MCNC: 1.2 MG/DL (ref 0.2–1)
BUN SERPL-MCNC: 20 MG/DL (ref 6–20)
BUN/CREAT SERPL: 18 (ref 12–20)
CA-I BLD-MCNC: 9 MG/DL (ref 8.5–10.1)
CHLORIDE SERPL-SCNC: 108 MMOL/L (ref 97–108)
CO2 SERPL-SCNC: 26 MMOL/L (ref 21–32)
CREAT SERPL-MCNC: 1.13 MG/DL (ref 0.7–1.3)
DIFFERENTIAL METHOD BLD: ABNORMAL
EOSINOPHIL # BLD: 0.5 K/UL (ref 0–0.4)
EOSINOPHIL NFR BLD: 9 % (ref 0–7)
ERYTHROCYTE [DISTWIDTH] IN BLOOD BY AUTOMATED COUNT: 13.4 % (ref 11.5–14.5)
GLOBULIN SER CALC-MCNC: 3.5 G/DL (ref 2–4)
GLUCOSE SERPL-MCNC: 145 MG/DL (ref 65–100)
HCT VFR BLD AUTO: 42.2 % (ref 36.6–50.3)
HGB BLD-MCNC: 14.2 G/DL (ref 12.1–17)
IMM GRANULOCYTES # BLD AUTO: 0 K/UL (ref 0–0.04)
IMM GRANULOCYTES NFR BLD AUTO: 0 % (ref 0–0.5)
INR PPP: 1 (ref 0.9–1.1)
LYMPHOCYTES # BLD: 1.2 K/UL (ref 0.8–3.5)
LYMPHOCYTES NFR BLD: 23 % (ref 12–49)
MCH RBC QN AUTO: 28.4 PG (ref 26–34)
MCHC RBC AUTO-ENTMCNC: 33.6 G/DL (ref 30–36.5)
MCV RBC AUTO: 84.4 FL (ref 80–99)
MONOCYTES # BLD: 0.5 K/UL (ref 0–1)
MONOCYTES NFR BLD: 9 % (ref 5–13)
NEUTS SEG # BLD: 3.1 K/UL (ref 1.8–8)
NEUTS SEG NFR BLD: 58 % (ref 32–75)
NRBC # BLD: 0 K/UL (ref 0–0.01)
NRBC BLD-RTO: 0 PER 100 WBC
PLATELET # BLD AUTO: 163 K/UL (ref 150–400)
PMV BLD AUTO: 11 FL (ref 8.9–12.9)
POTASSIUM SERPL-SCNC: 3.8 MMOL/L (ref 3.5–5.1)
PROT SERPL-MCNC: 7.1 G/DL (ref 6.4–8.2)
PROTHROMBIN TIME: 13.8 SEC (ref 11.9–14.6)
RBC # BLD AUTO: 5 M/UL (ref 4.1–5.7)
SODIUM SERPL-SCNC: 138 MMOL/L (ref 136–145)
THERAPEUTIC RANGE: NORMAL SEC (ref 82–109)
WBC # BLD AUTO: 5.3 K/UL (ref 4.1–11.1)

## 2024-01-12 PROCEDURE — 80053 COMPREHEN METABOLIC PANEL: CPT

## 2024-01-12 PROCEDURE — 85025 COMPLETE CBC W/AUTO DIFF WBC: CPT

## 2024-01-12 PROCEDURE — 85730 THROMBOPLASTIN TIME PARTIAL: CPT

## 2024-01-12 PROCEDURE — 36415 COLL VENOUS BLD VENIPUNCTURE: CPT

## 2024-01-12 PROCEDURE — 85610 PROTHROMBIN TIME: CPT

## 2024-01-12 PROCEDURE — 93005 ELECTROCARDIOGRAM TRACING: CPT

## 2024-01-12 RX ORDER — LISINOPRIL 5 MG/1
5 TABLET ORAL DAILY
COMMUNITY

## 2024-01-12 RX ORDER — FUROSEMIDE 40 MG/1
40 TABLET ORAL DAILY
COMMUNITY

## 2024-01-12 RX ORDER — CARVEDILOL 12.5 MG/1
12.5 TABLET ORAL 2 TIMES DAILY WITH MEALS
COMMUNITY

## 2024-01-12 RX ORDER — ATORVASTATIN CALCIUM 40 MG/1
40 TABLET, FILM COATED ORAL NIGHTLY
COMMUNITY

## 2024-01-12 RX ORDER — POTASSIUM CHLORIDE 1.5 G/1.58G
20 POWDER, FOR SOLUTION ORAL DAILY
COMMUNITY

## 2024-01-12 RX ORDER — CLOPIDOGREL BISULFATE 75 MG/1
75 TABLET ORAL DAILY
COMMUNITY

## 2024-01-12 ASSESSMENT — PAIN SCALES - GENERAL: PAINLEVEL_OUTOF10: 0

## 2024-01-17 ENCOUNTER — HOSPITAL ENCOUNTER (OUTPATIENT)
Facility: HOSPITAL | Age: 64
Discharge: HOME | End: 2024-01-17
Attending: INTERNAL MEDICINE | Admitting: INTERNAL MEDICINE
Payer: MEDICARE

## 2024-01-17 VITALS
DIASTOLIC BLOOD PRESSURE: 81 MMHG | TEMPERATURE: 97.6 F | HEART RATE: 64 BPM | OXYGEN SATURATION: 96 % | SYSTOLIC BLOOD PRESSURE: 131 MMHG | RESPIRATION RATE: 20 BRPM

## 2024-01-17 DIAGNOSIS — R94.39 ABNORMAL STRESS TEST: ICD-10-CM

## 2024-01-17 PROBLEM — R07.9 CHEST PAIN: Status: ACTIVE | Noted: 2024-01-17

## 2024-01-17 PROCEDURE — 7100000001 HC PACU RECOVERY - ADDTL 15 MIN: Performed by: INTERNAL MEDICINE

## 2024-01-17 PROCEDURE — C1760 CLOSURE DEV, VASC: HCPCS | Performed by: INTERNAL MEDICINE

## 2024-01-17 PROCEDURE — 99152 MOD SED SAME PHYS/QHP 5/>YRS: CPT | Performed by: INTERNAL MEDICINE

## 2024-01-17 PROCEDURE — 6360000002 HC RX W HCPCS: Performed by: INTERNAL MEDICINE

## 2024-01-17 PROCEDURE — 7100000000 HC PACU RECOVERY - FIRST 15 MIN: Performed by: INTERNAL MEDICINE

## 2024-01-17 PROCEDURE — 93459 L HRT ART/GRFT ANGIO: CPT | Performed by: INTERNAL MEDICINE

## 2024-01-17 PROCEDURE — C1769 GUIDE WIRE: HCPCS | Performed by: INTERNAL MEDICINE

## 2024-01-17 PROCEDURE — 99153 MOD SED SAME PHYS/QHP EA: CPT | Performed by: INTERNAL MEDICINE

## 2024-01-17 PROCEDURE — 2709999900 HC NON-CHARGEABLE SUPPLY: Performed by: INTERNAL MEDICINE

## 2024-01-17 PROCEDURE — 7100000010 HC PHASE II RECOVERY - FIRST 15 MIN: Performed by: INTERNAL MEDICINE

## 2024-01-17 PROCEDURE — C1894 INTRO/SHEATH, NON-LASER: HCPCS | Performed by: INTERNAL MEDICINE

## 2024-01-17 PROCEDURE — 6360000004 HC RX CONTRAST MEDICATION: Performed by: INTERNAL MEDICINE

## 2024-01-17 PROCEDURE — 2580000003 HC RX 258: Performed by: INTERNAL MEDICINE

## 2024-01-17 PROCEDURE — 76937 US GUIDE VASCULAR ACCESS: CPT | Performed by: INTERNAL MEDICINE

## 2024-01-17 PROCEDURE — 2500000003 HC RX 250 WO HCPCS: Performed by: INTERNAL MEDICINE

## 2024-01-17 PROCEDURE — 7100000011 HC PHASE II RECOVERY - ADDTL 15 MIN: Performed by: INTERNAL MEDICINE

## 2024-01-17 RX ORDER — SODIUM CHLORIDE 9 MG/ML
INJECTION, SOLUTION INTRAVENOUS PRN
OUTPATIENT
Start: 2024-01-17

## 2024-01-17 RX ORDER — SODIUM CHLORIDE 0.9 % (FLUSH) 0.9 %
5-40 SYRINGE (ML) INJECTION EVERY 12 HOURS SCHEDULED
OUTPATIENT
Start: 2024-01-17

## 2024-01-17 RX ORDER — FENTANYL CITRATE 50 UG/ML
INJECTION, SOLUTION INTRAMUSCULAR; INTRAVENOUS PRN
Status: DISCONTINUED | OUTPATIENT
Start: 2024-01-17 | End: 2024-01-17 | Stop reason: HOSPADM

## 2024-01-17 RX ORDER — MIDAZOLAM HYDROCHLORIDE 1 MG/ML
INJECTION INTRAMUSCULAR; INTRAVENOUS PRN
Status: DISCONTINUED | OUTPATIENT
Start: 2024-01-17 | End: 2024-01-17 | Stop reason: HOSPADM

## 2024-01-17 RX ORDER — HEPARIN SODIUM 200 [USP'U]/100ML
INJECTION, SOLUTION INTRAVENOUS CONTINUOUS PRN
Status: COMPLETED | OUTPATIENT
Start: 2024-01-17 | End: 2024-01-17

## 2024-01-17 RX ORDER — ONDANSETRON 2 MG/ML
4 INJECTION INTRAMUSCULAR; INTRAVENOUS EVERY 6 HOURS PRN
OUTPATIENT
Start: 2024-01-17

## 2024-01-17 RX ORDER — ACETAMINOPHEN 325 MG/1
650 TABLET ORAL EVERY 4 HOURS PRN
OUTPATIENT
Start: 2024-01-17

## 2024-01-17 RX ORDER — SODIUM CHLORIDE 9 MG/ML
INJECTION, SOLUTION INTRAVENOUS CONTINUOUS
Status: DISCONTINUED | OUTPATIENT
Start: 2024-01-17 | End: 2024-01-17 | Stop reason: HOSPADM

## 2024-01-17 RX ORDER — LIDOCAINE HYDROCHLORIDE 10 MG/ML
INJECTION, SOLUTION INFILTRATION; PERINEURAL PRN
Status: DISCONTINUED | OUTPATIENT
Start: 2024-01-17 | End: 2024-01-17 | Stop reason: HOSPADM

## 2024-01-17 RX ORDER — SODIUM CHLORIDE 0.9 % (FLUSH) 0.9 %
5-40 SYRINGE (ML) INJECTION PRN
OUTPATIENT
Start: 2024-01-17

## 2024-01-17 RX ADMIN — SODIUM CHLORIDE: 9 INJECTION, SOLUTION INTRAVENOUS at 08:02

## 2024-01-17 ASSESSMENT — PAIN - FUNCTIONAL ASSESSMENT
PAIN_FUNCTIONAL_ASSESSMENT: 0-10
PAIN_FUNCTIONAL_ASSESSMENT: 0-10
PAIN_FUNCTIONAL_ASSESSMENT: NONE - DENIES PAIN

## 2024-01-17 NOTE — DISCHARGE INSTRUCTIONS
Coronary Angiogram: What to Expect at Home  Your Recovery     A coronary angiogram is a test to examine the large blood vessels of your heart (coronary arteries). The doctor inserted a thin, flexible tube (catheter) into a blood vessel in your groin or wrist.  Your groin or wrist may have a bruise and feel sore for a few days after the procedure. You can do light activities around the house. But do not do anything strenuous until your doctor says it is okay. This may be for several days.  This care sheet gives you a general idea about how long it will take for you to recover. But each person recovers at a different pace. Follow the steps below to feel better as quickly as possible.  How can you care for yourself at home?  Activity    If the doctor gave you a sedative:  For 24 hours, don't do anything that requires attention to detail, such as going to work, making important decisions, or signing any legal documents. It takes time for the medicine's effects to completely wear off.  For your safety, do not drive or operate any machinery that could be dangerous. Wait until the medicine wears off and you can think clearly and react easily.     Do not do strenuous exercise and do not lift, pull, or push anything heavy until your doctor says it is okay. This may be for several days. You can walk around the house and do light activity, such as cooking.     If the catheter was placed in your groin, try not to walk up stairs for the first couple of days.     If the catheter was placed in your wrist, do not bend your wrist deeply for the first couple of days. Be careful using your hand to get into and out of a chair or bed.     Get regular exercise. Walking may be a good choice. Try for at least 30 minutes on most days of the week.   Diet    Drink plenty of fluids to help your body flush out the dye. If you have kidney, heart, or liver disease and have to limit fluids, talk with your doctor before you increase the amount

## 2024-01-17 NOTE — PROGRESS NOTES
Discharge instructions reviewed with patient and patient's wife. Patient has no further questions. IV removed; cath tip intact. Patient to be transported home by wife.

## 2024-01-17 NOTE — PROGRESS NOTES
SMALL AMT BLEEDING NOTED ON RIGHT CORNER OF DRESSING RIGHT GROIN , AREA CIRCLED AND PRESSURE APPLIED , PT ENCOURAGED TO KEEP RIGHT LEG STRAIGHT , NO DISTRESS NOTED

## 2025-05-10 ENCOUNTER — APPOINTMENT (OUTPATIENT)
Facility: HOSPITAL | Age: 65
End: 2025-05-10
Payer: MEDICARE

## 2025-05-10 ENCOUNTER — HOSPITAL ENCOUNTER (EMERGENCY)
Facility: HOSPITAL | Age: 65
Discharge: HOME | End: 2025-05-10
Attending: EMERGENCY MEDICINE
Payer: MEDICARE

## 2025-05-10 VITALS
OXYGEN SATURATION: 96 % | HEART RATE: 61 BPM | HEIGHT: 66 IN | DIASTOLIC BLOOD PRESSURE: 65 MMHG | BODY MASS INDEX: 45 KG/M2 | SYSTOLIC BLOOD PRESSURE: 121 MMHG | WEIGHT: 280 LBS | RESPIRATION RATE: 16 BRPM | TEMPERATURE: 97.8 F

## 2025-05-10 DIAGNOSIS — I20.9 ANGINA PECTORIS: ICD-10-CM

## 2025-05-10 DIAGNOSIS — R07.9 CHEST PAIN, UNSPECIFIED TYPE: Primary | ICD-10-CM

## 2025-05-10 LAB
ALBUMIN SERPL-MCNC: 3.4 G/DL (ref 3.5–5)
ALBUMIN/GLOB SERPL: 1.1 (ref 1.1–2.2)
ALP SERPL-CCNC: 66 U/L (ref 45–117)
ALT SERPL-CCNC: 37 U/L (ref 12–78)
ANION GAP SERPL CALC-SCNC: 4 MMOL/L (ref 2–12)
AST SERPL W P-5'-P-CCNC: 24 U/L (ref 15–37)
BASOPHILS # BLD: 0.05 K/UL (ref 0–0.1)
BASOPHILS NFR BLD: 0.9 % (ref 0–1)
BILIRUB SERPL-MCNC: 1.3 MG/DL (ref 0.2–1)
BUN SERPL-MCNC: 15 MG/DL (ref 6–20)
BUN/CREAT SERPL: 15 (ref 12–20)
CA-I BLD-MCNC: 8.9 MG/DL (ref 8.5–10.1)
CHLORIDE SERPL-SCNC: 106 MMOL/L (ref 97–108)
CO2 SERPL-SCNC: 28 MMOL/L (ref 21–32)
CREAT SERPL-MCNC: 1 MG/DL (ref 0.7–1.3)
DIFFERENTIAL METHOD BLD: NORMAL
EOSINOPHIL # BLD: 0.36 K/UL (ref 0–0.4)
EOSINOPHIL NFR BLD: 6.6 % (ref 0–7)
ERYTHROCYTE [DISTWIDTH] IN BLOOD BY AUTOMATED COUNT: 13.5 % (ref 11.5–14.5)
GLOBULIN SER CALC-MCNC: 3.2 G/DL (ref 2–4)
GLUCOSE SERPL-MCNC: 197 MG/DL (ref 65–100)
HCT VFR BLD AUTO: 39.1 % (ref 36.6–50.3)
HGB BLD-MCNC: 13 G/DL (ref 12.1–17)
IMM GRANULOCYTES # BLD AUTO: 0.02 K/UL (ref 0–0.04)
IMM GRANULOCYTES NFR BLD AUTO: 0.4 % (ref 0–0.5)
LYMPHOCYTES # BLD: 1.07 K/UL (ref 0.8–3.5)
LYMPHOCYTES NFR BLD: 19.7 % (ref 12–49)
MAGNESIUM SERPL-MCNC: 2.2 MG/DL (ref 1.6–2.4)
MCH RBC QN AUTO: 27.9 PG (ref 26–34)
MCHC RBC AUTO-ENTMCNC: 33.2 G/DL (ref 30–36.5)
MCV RBC AUTO: 83.9 FL (ref 80–99)
MONOCYTES # BLD: 0.41 K/UL (ref 0–1)
MONOCYTES NFR BLD: 7.6 % (ref 5–13)
NEUTS SEG # BLD: 3.51 K/UL (ref 1.8–8)
NEUTS SEG NFR BLD: 64.8 % (ref 32–75)
NRBC # BLD: 0 K/UL (ref 0–0.01)
NRBC BLD-RTO: 0 PER 100 WBC
PLATELET # BLD AUTO: 177 K/UL (ref 150–400)
PMV BLD AUTO: 11 FL (ref 8.9–12.9)
POTASSIUM SERPL-SCNC: 3.9 MMOL/L (ref 3.5–5.1)
PROT SERPL-MCNC: 6.6 G/DL (ref 6.4–8.2)
RBC # BLD AUTO: 4.66 M/UL (ref 4.1–5.7)
SODIUM SERPL-SCNC: 138 MMOL/L (ref 136–145)
TROPONIN I SERPL HS-MCNC: 52 NG/L (ref 0–76)
TROPONIN I SERPL HS-MCNC: 56 NG/L (ref 0–76)
WBC # BLD AUTO: 5.4 K/UL (ref 4.1–11.1)

## 2025-05-10 PROCEDURE — 84484 ASSAY OF TROPONIN QUANT: CPT

## 2025-05-10 PROCEDURE — 80053 COMPREHEN METABOLIC PANEL: CPT

## 2025-05-10 PROCEDURE — 83735 ASSAY OF MAGNESIUM: CPT

## 2025-05-10 PROCEDURE — 85025 COMPLETE CBC W/AUTO DIFF WBC: CPT

## 2025-05-10 PROCEDURE — 71045 X-RAY EXAM CHEST 1 VIEW: CPT

## 2025-05-10 PROCEDURE — 36415 COLL VENOUS BLD VENIPUNCTURE: CPT

## 2025-05-10 PROCEDURE — 93005 ELECTROCARDIOGRAM TRACING: CPT | Performed by: EMERGENCY MEDICINE

## 2025-05-10 PROCEDURE — 99285 EMERGENCY DEPT VISIT HI MDM: CPT

## 2025-05-10 RX ORDER — NITROGLYCERIN 0.4 MG/1
0.4 TABLET SUBLINGUAL EVERY 5 MIN PRN
Qty: 25 TABLET | Refills: 0 | Status: SHIPPED | OUTPATIENT
Start: 2025-05-10

## 2025-05-10 RX ORDER — ASPIRIN 81 MG/1
324 TABLET, CHEWABLE ORAL ONCE
Status: DISCONTINUED | OUTPATIENT
Start: 2025-05-10 | End: 2025-05-10 | Stop reason: HOSPADM

## 2025-05-10 ASSESSMENT — LIFESTYLE VARIABLES
HOW OFTEN DO YOU HAVE A DRINK CONTAINING ALCOHOL: NEVER
HOW MANY STANDARD DRINKS CONTAINING ALCOHOL DO YOU HAVE ON A TYPICAL DAY: PATIENT DOES NOT DRINK

## 2025-05-10 ASSESSMENT — PAIN - FUNCTIONAL ASSESSMENT: PAIN_FUNCTIONAL_ASSESSMENT: NONE - DENIES PAIN

## 2025-05-10 NOTE — ED PROVIDER NOTES
[facility] EMERGENCY DEPT  EMERGENCY DEPARTMENT HISTORY AND PHYSICAL EXAM      Date of evaluation: 5/10/2025  Patient Name: Salvatore Davidson  Birthdate 1960  MRN: [id number]  ED Provider: Hali Rg MD   Note Started: 3:52 PM EDT 5/10/25    HISTORY OF PRESENT ILLNESS     Chief Complaint   Patient presents with    Chest Pain       History Provided By: Patient, EMS     HPI: Salvatore Davidson is a 65 y.o. male patient with a history of Alzheimer's coronary disease prior history of MI with stents.  Patient had chest pain for a few days EMS called gave him nitro his chest pain is since resolved and has no further chest pain does follow with cardiology Dr. Black    PAST MEDICAL HISTORY   Past Medical History:  Past Medical History:   Diagnosis Date    Alzheimer's dementia (HCC)     CAD (coronary artery disease)     Cancer (HCC) 2008    history of bone cancer- treated with chemo    Chronic kidney disease     monitored by PCP, does not follow up with nephrology    Cirrhosis (HCC)     Former consumption of alcohol     heavy; quit 12 years ago    Former smoker     Heart attack (HCC) 2022    Hyperlipidemia     Hypertension     Leaky heart valve     Stroke (HCC)        Past Surgical History:  Past Surgical History:   Procedure Laterality Date    CARDIAC PROCEDURE N/A 1/17/2024    Left heart cath / coronary angiography performed by Ole Black MD at [facility] CARDIAC CATH LAB    CORONARY ANGIOPLASTY WITH STENT PLACEMENT      Mid LAD    CORONARY ARTERY BYPASS GRAFT REDO  2022    triple    HERNIA REPAIR Bilateral     inguinal       Family History:  Family History   Problem Relation Age of Onset    Cancer Mother     Heart Disease Father     Stroke Brother        Social History:  Social History     Tobacco Use    Smoking status: Former     Types: Cigarettes    Smokeless tobacco: Never   Vaping Use    Vaping status: Never Used   Substance Use Topics    Alcohol use: Not Currently     Comment: sober for 12 years    Drug use: Not

## 2025-05-10 NOTE — DISCHARGE INSTRUCTIONS
Thank you for choosing our Emergency Department for your care.  It is our privilege to care for you in your time of need.  In the next several days, you may receive a survey via email or mailed to your home about your experience with our team.  We would greatly appreciate you taking a few minutes to complete the survey, as we use this information to learn what we have done well and what we could be doing better. Thank you for trusting us with your care!    Below you will find a list of your tests from today's visit.   Labs and Radiology Studies  Recent Results (from the past 12 hours)   Troponin    Collection Time: 05/10/25  3:53 PM   Result Value Ref Range    Troponin, High Sensitivity 56 0 - 76 ng/L   Magnesium    Collection Time: 05/10/25  3:53 PM   Result Value Ref Range    Magnesium 2.2 1.6 - 2.4 mg/dL   Comprehensive Metabolic Panel    Collection Time: 05/10/25  3:53 PM   Result Value Ref Range    Sodium 138 136 - 145 mmol/L    Potassium 3.9 3.5 - 5.1 mmol/L    Chloride 106 97 - 108 mmol/L    CO2 28 21 - 32 mmol/L    Anion Gap 4 2 - 12 mmol/L    Glucose 197 (H) 65 - 100 mg/dL    BUN 15 6 - 20 mg/dL    Creatinine 1.00 0.70 - 1.30 mg/dL    BUN/Creatinine Ratio 15 12 - 20      Est, Glom Filt Rate 84 >60 ml/min/1.73m2    Calcium 8.9 8.5 - 10.1 mg/dL    Total Bilirubin 1.3 (H) 0.2 - 1.0 mg/dL    AST 24 15 - 37 U/L    ALT 37 12 - 78 U/L    Alk Phosphatase 66 45 - 117 U/L    Total Protein 6.6 6.4 - 8.2 g/dL    Albumin 3.4 (L) 3.5 - 5.0 g/dL    Globulin 3.2 2.0 - 4.0 g/dL    Albumin/Globulin Ratio 1.1 1.1 - 2.2     CBC with Auto Differential    Collection Time: 05/10/25  3:53 PM   Result Value Ref Range    WBC 5.4 4.1 - 11.1 K/uL    RBC 4.66 4.10 - 5.70 M/uL    Hemoglobin 13.0 12.1 - 17.0 g/dL    Hematocrit 39.1 36.6 - 50.3 %    MCV 83.9 80.0 - 99.0 FL    MCH 27.9 26.0 - 34.0 PG    MCHC 33.2 30.0 - 36.5 g/dL    RDW 13.5 11.5 - 14.5 %    Platelets 177 150 - 400 K/uL    MPV 11.0 8.9 - 12.9 FL    Nucleated RBCs 0.0  provided, please fill it as soon as possible to prevent a delay in treatment. If you have any questions or reservations about taking the medication due to side effects or interactions with other medications, please call your primary care provider or contact us directly.  Again, THANK YOU for choosing us to care for YOU!

## 2025-05-11 LAB
EKG ATRIAL RATE: 72 BPM
EKG DIAGNOSIS: NORMAL
EKG P AXIS: 49 DEGREES
EKG P-R INTERVAL: 206 MS
EKG Q-T INTERVAL: 430 MS
EKG QRS DURATION: 86 MS
EKG QTC CALCULATION (BAZETT): 470 MS
EKG R AXIS: -8 DEGREES
EKG T AXIS: 30 DEGREES
EKG VENTRICULAR RATE: 72 BPM

## (undated) DEVICE — 3M™ TEGADERM™ TRANSPARENT FILM DRESSING FRAME STYLE, 1626W, 4 IN X 4-3/4 IN (10 CM X 12 CM), 50/CT 4CT/CASE: Brand: 3M™ TEGADERM™

## (undated) DEVICE — WASTEBAG DRIP/ADAPTER: Brand: MEDLINE INDUSTRIES, INC.

## (undated) DEVICE — SUT SLK 0 30IN CT1 BLK --

## (undated) DEVICE — CATH 5F 110CM PG145 -- DXTERITY

## (undated) DEVICE — CATH 5F 100CM JL35 -- DXTERITY

## (undated) DEVICE — CATH 5F 100CM JR40 -- DXTERITY

## (undated) DEVICE — BAND COMPR L24CM REG CLR PLAS HEMSTAT EXT HK AND LOOP RETEN

## (undated) DEVICE — SYRINGE MED 10ML PUR GAM COMPATIBLE POLYCARB FIX M LUER CONN

## (undated) DEVICE — RUNTHROUGH NS EXTRA FLOPPY PTCA GUIDEWIRE: Brand: RUNTHROUGH

## (undated) DEVICE — MICROPUNCTURE INTRODUCER SET SILHOUETTE TRANSITIONLESS PUSH-PLUS DESIGN - STIFFENED CANNULA WITH STAINLESS STEEL WIRE GUIDE: Brand: MICROPUNCTURE

## (undated) DEVICE — FEMORAL ENTRY ANGIOGRAPHY SHIELD-BLUE: Brand: RADPAD

## (undated) DEVICE — CATHETER ANGIO JR4 PIG STD AD 4 FRX110 CM MP QUIK CARE INFIN

## (undated) DEVICE — 48" PROBE COVER W/GEL, ULTRASOUND, STERILE: Brand: SITE-RITE

## (undated) DEVICE — SYRINGE MED 10ML RED POLYCARB BRL FIX M LUER CONN FLAT GRP

## (undated) DEVICE — GUIDEWIRE VASC L150CM DIA0.035IN TIP L3MM PTFE J CRV FIX

## (undated) DEVICE — BOWL UTIL GRAD 32OZ STRL --

## (undated) DEVICE — ANGIO-SEAL VIP VASCULAR CLOSURE DEVICE: Brand: ANGIO-SEAL

## (undated) DEVICE — PINNACLE INTRODUCER SHEATH: Brand: PINNACLE

## (undated) DEVICE — DEVICE TORQ 0009 0018IN GRN PTFE GWIRE ANGIO COAT PIN VISE

## (undated) DEVICE — Device: Brand: EAGLE EYE PLATINUM ST RX DIGITAL IVUS CATHETER

## (undated) DEVICE — SC 3W MP RA OFF PB - PG: Brand: NAMIC

## (undated) DEVICE — DEVICE INFL SYR BLLN ENDO 30 -- INTELLI

## (undated) DEVICE — PRESSURE TUBING: Brand: TRUWAVE

## (undated) DEVICE — TOWEL,OR,DSP,ST,BLUE,DLX,6/PK,12PK/CS: Brand: MEDLINE

## (undated) DEVICE — SYRINGE ANGIO 8ML COR CTRL ROT ADPT SLD PLUNG CLR BRL M

## (undated) DEVICE — Device

## (undated) DEVICE — CATHETER DIAG 5FR L100CM SPEC IMA CRV SZ DBL BRAID WIRE SFT

## (undated) DEVICE — 3M™ TEGADERM™ TRANSPARENT FILM DRESSING FRAME STYLE, 1627, 4 IN X 10 IN (10 CM X 25 CM), 20/CT 4CT/CASE: Brand: 3M™ TEGADERM™

## (undated) DEVICE — PINNACLE R/O II INTRODUCER SHEATH WITH RADIOPAQUE MARKER: Brand: PINNACLE

## (undated) DEVICE — COPE MANDRIL WIRE GUIDE STAINLESS STEEL: Brand: COPE

## (undated) DEVICE — KIT COR DIAG CATH ANGIO 5FR CURVES JL 4.0 100CM JR 4.0

## (undated) DEVICE — SURGICAL PROCEDURE TRAY CRD CATH 3 PRT

## (undated) DEVICE — 3M™ STERI-DRAPE™ SMALL DRAPE WITH ADHESIVE APERTURE 1092 25/BX,4/CS&#X20;: Brand: STERI-DRAPE™

## (undated) DEVICE — PRESSURE MONITORING SET: Brand: TRUWAVE

## (undated) DEVICE — GUIDEWIRE VASC L260CM DIA0.035IN RAD 3MM J TIP L7CM PTFE

## (undated) DEVICE — CATH BLLN DIL 2.0X12MM RX -- EUPHORA

## (undated) DEVICE — GLIDESHEATH SLENDER STAINLESS STEEL KIT: Brand: GLIDESHEATH SLENDER

## (undated) DEVICE — BOWL UTIL 16OZ STRL --

## (undated) DEVICE — CATH GUID COR JL3.5 6FR 100CM -- LAUNCHER

## (undated) DEVICE — SYRINGE ANGIO 20ML WHT POLYCARB VAC PRSS CAP PLUNG FIX M

## (undated) DEVICE — TOOL INSRT ANGI GDWIRE MTL SS --

## (undated) DEVICE — GUIDEWIRE VASC L260CM DIA0.035IN TIP L3MM STD EXCHG PTFE J

## (undated) DEVICE — CATHETER IABP 8FR 50CC FBROPT CONN W INSRT KT TWO STATLOK

## (undated) DEVICE — TUBING PRESS INJ FLX SH 30IN --

## (undated) DEVICE — Z DUPLICATE USE 2103554 VALVE HEMOSTATIC BLEEDBK CTRL COPILOT

## (undated) DEVICE — CATH ANGI BLLN DIL 3.25X12MM -- NC EUPHORA

## (undated) DEVICE — DEVICE VASC CLSR 5FR GRY W/ INTEGR SEAL 10ML LOK SYR

## (undated) DEVICE — GAUZE,SPONGE,4"X4",16PLY,STRL,LF,10/TRAY: Brand: MEDLINE